# Patient Record
Sex: MALE | Race: WHITE | Employment: OTHER | URBAN - METROPOLITAN AREA
[De-identification: names, ages, dates, MRNs, and addresses within clinical notes are randomized per-mention and may not be internally consistent; named-entity substitution may affect disease eponyms.]

---

## 2017-02-20 ENCOUNTER — HOSPITAL ENCOUNTER (OUTPATIENT)
Dept: PREADMISSION TESTING | Age: 67
Discharge: HOME | End: 2017-02-20
Payer: MEDICARE

## 2017-02-20 VITALS
RESPIRATION RATE: 20 BRPM | TEMPERATURE: 98.4 F | DIASTOLIC BLOOD PRESSURE: 85 MMHG | OXYGEN SATURATION: 97 % | HEART RATE: 78 BPM | HEIGHT: 72 IN | SYSTOLIC BLOOD PRESSURE: 142 MMHG | BODY MASS INDEX: 31.83 KG/M2 | WEIGHT: 235 LBS

## 2017-02-20 LAB
ANION GAP SERPL CALCULATED.3IONS-SCNC: 14 MMOL/L (ref 9–17)
BUN BLDV-MCNC: 12 MG/DL (ref 8–23)
CHLORIDE BLD-SCNC: 102 MMOL/L (ref 98–107)
CO2: 26 MMOL/L (ref 20–31)
CREAT SERPL-MCNC: 0.8 MG/DL (ref 0.7–1.2)
GFR AFRICAN AMERICAN: >60 ML/MIN
GFR NON-AFRICAN AMERICAN: >60 ML/MIN
GFR SERPL CREATININE-BSD FRML MDRD: NORMAL ML/MIN/{1.73_M2}
GFR SERPL CREATININE-BSD FRML MDRD: NORMAL ML/MIN/{1.73_M2}
GLUCOSE BLD-MCNC: 92 MG/DL (ref 70–99)
POTASSIUM SERPL-SCNC: 3.8 MMOL/L (ref 3.7–5.3)
SODIUM BLD-SCNC: 142 MMOL/L (ref 135–144)

## 2017-02-20 PROCEDURE — 36415 COLL VENOUS BLD VENIPUNCTURE: CPT

## 2017-02-20 PROCEDURE — 80051 ELECTROLYTE PANEL: CPT

## 2017-02-20 PROCEDURE — 93005 ELECTROCARDIOGRAM TRACING: CPT

## 2017-02-20 PROCEDURE — 82947 ASSAY GLUCOSE BLOOD QUANT: CPT

## 2017-02-20 PROCEDURE — 84520 ASSAY OF UREA NITROGEN: CPT

## 2017-02-20 PROCEDURE — 82565 ASSAY OF CREATININE: CPT

## 2017-02-20 RX ORDER — SODIUM CHLORIDE, SODIUM LACTATE, POTASSIUM CHLORIDE, CALCIUM CHLORIDE 600; 310; 30; 20 MG/100ML; MG/100ML; MG/100ML; MG/100ML
1000 INJECTION, SOLUTION INTRAVENOUS CONTINUOUS
Status: CANCELLED | OUTPATIENT
Start: 2017-02-20

## 2017-02-21 LAB
EKG ATRIAL RATE: 72 BPM
EKG P AXIS: 15 DEGREES
EKG P-R INTERVAL: 158 MS
EKG Q-T INTERVAL: 374 MS
EKG QRS DURATION: 90 MS
EKG QTC CALCULATION (BAZETT): 409 MS
EKG R AXIS: -20 DEGREES
EKG T AXIS: 52 DEGREES
EKG VENTRICULAR RATE: 72 BPM

## 2017-02-23 ENCOUNTER — ANESTHESIA (OUTPATIENT)
Dept: OPERATING ROOM | Age: 67
End: 2017-02-23
Payer: MEDICARE

## 2017-02-23 ENCOUNTER — HOSPITAL ENCOUNTER (OUTPATIENT)
Age: 67
Setting detail: OUTPATIENT SURGERY
Discharge: HOME | End: 2017-02-23
Attending: ORTHOPAEDIC SURGERY | Admitting: ORTHOPAEDIC SURGERY
Payer: MEDICARE

## 2017-02-23 ENCOUNTER — ANESTHESIA EVENT (OUTPATIENT)
Dept: OPERATING ROOM | Age: 67
End: 2017-02-23
Payer: MEDICARE

## 2017-02-23 VITALS
WEIGHT: 235 LBS | OXYGEN SATURATION: 98 % | RESPIRATION RATE: 23 BRPM | DIASTOLIC BLOOD PRESSURE: 95 MMHG | SYSTOLIC BLOOD PRESSURE: 148 MMHG | BODY MASS INDEX: 31.83 KG/M2 | TEMPERATURE: 97.9 F | HEIGHT: 72 IN | HEART RATE: 78 BPM

## 2017-02-23 VITALS — OXYGEN SATURATION: 100 % | TEMPERATURE: 95.6 F | SYSTOLIC BLOOD PRESSURE: 114 MMHG | DIASTOLIC BLOOD PRESSURE: 85 MMHG

## 2017-02-23 PROCEDURE — 6360000002 HC RX W HCPCS: Performed by: ANESTHESIOLOGY

## 2017-02-23 PROCEDURE — 2500000003 HC RX 250 WO HCPCS: Performed by: NURSE ANESTHETIST, CERTIFIED REGISTERED

## 2017-02-23 PROCEDURE — C1713 ANCHOR/SCREW BN/BN,TIS/BN: HCPCS | Performed by: ORTHOPAEDIC SURGERY

## 2017-02-23 PROCEDURE — 7100000011 HC PHASE II RECOVERY - ADDTL 15 MIN: Performed by: ORTHOPAEDIC SURGERY

## 2017-02-23 PROCEDURE — 6360000002 HC RX W HCPCS: Performed by: ORTHOPAEDIC SURGERY

## 2017-02-23 PROCEDURE — 7100000010 HC PHASE II RECOVERY - FIRST 15 MIN: Performed by: ORTHOPAEDIC SURGERY

## 2017-02-23 PROCEDURE — 3700000000 HC ANESTHESIA ATTENDED CARE: Performed by: ORTHOPAEDIC SURGERY

## 2017-02-23 PROCEDURE — 3600000014 HC SURGERY LEVEL 4 ADDTL 15MIN: Performed by: ORTHOPAEDIC SURGERY

## 2017-02-23 PROCEDURE — 3700000001 HC ADD 15 MINUTES (ANESTHESIA): Performed by: ORTHOPAEDIC SURGERY

## 2017-02-23 PROCEDURE — 2580000003 HC RX 258: Performed by: ANESTHESIOLOGY

## 2017-02-23 PROCEDURE — 6360000002 HC RX W HCPCS: Performed by: NURSE ANESTHETIST, CERTIFIED REGISTERED

## 2017-02-23 PROCEDURE — 3600000004 HC SURGERY LEVEL 4 BASE: Performed by: ORTHOPAEDIC SURGERY

## 2017-02-23 PROCEDURE — 7100000000 HC PACU RECOVERY - FIRST 15 MIN: Performed by: ORTHOPAEDIC SURGERY

## 2017-02-23 PROCEDURE — 7100000001 HC PACU RECOVERY - ADDTL 15 MIN: Performed by: ORTHOPAEDIC SURGERY

## 2017-02-23 PROCEDURE — 2720000003 HC MISC SUTURE/STAPLES/RELOADS/ETC: Performed by: ORTHOPAEDIC SURGERY

## 2017-02-23 DEVICE — MAGNUM 2 KNOTLESS IMPLANT
Type: IMPLANTABLE DEVICE | Site: SHOULDER | Status: FUNCTIONAL
Brand: MAGNUM

## 2017-02-23 RX ORDER — MEPERIDINE HYDROCHLORIDE 50 MG/ML
12.5 INJECTION INTRAMUSCULAR; INTRAVENOUS; SUBCUTANEOUS EVERY 5 MIN PRN
Status: DISCONTINUED | OUTPATIENT
Start: 2017-02-23 | End: 2017-02-23 | Stop reason: HOSPADM

## 2017-02-23 RX ORDER — KETOROLAC TROMETHAMINE 30 MG/ML
15 INJECTION, SOLUTION INTRAMUSCULAR; INTRAVENOUS ONCE
Status: COMPLETED | OUTPATIENT
Start: 2017-02-23 | End: 2017-02-23

## 2017-02-23 RX ORDER — GLYCOPYRROLATE 0.2 MG/ML
INJECTION INTRAMUSCULAR; INTRAVENOUS PRN
Status: DISCONTINUED | OUTPATIENT
Start: 2017-02-23 | End: 2017-02-23 | Stop reason: SDUPTHER

## 2017-02-23 RX ORDER — SODIUM CHLORIDE, SODIUM LACTATE, POTASSIUM CHLORIDE, CALCIUM CHLORIDE 600; 310; 30; 20 MG/100ML; MG/100ML; MG/100ML; MG/100ML
1000 INJECTION, SOLUTION INTRAVENOUS CONTINUOUS
Status: DISCONTINUED | OUTPATIENT
Start: 2017-02-23 | End: 2017-02-23 | Stop reason: HOSPADM

## 2017-02-23 RX ORDER — DIPHENHYDRAMINE HYDROCHLORIDE 50 MG/ML
12.5 INJECTION INTRAMUSCULAR; INTRAVENOUS
Status: DISCONTINUED | OUTPATIENT
Start: 2017-02-23 | End: 2017-02-23 | Stop reason: HOSPADM

## 2017-02-23 RX ORDER — FENTANYL CITRATE 50 UG/ML
100 INJECTION, SOLUTION INTRAMUSCULAR; INTRAVENOUS
Status: DISCONTINUED | OUTPATIENT
Start: 2017-02-23 | End: 2017-02-23

## 2017-02-23 RX ORDER — LIDOCAINE HYDROCHLORIDE 10 MG/ML
INJECTION, SOLUTION EPIDURAL; INFILTRATION; INTRACAUDAL; PERINEURAL PRN
Status: DISCONTINUED | OUTPATIENT
Start: 2017-02-23 | End: 2017-02-23 | Stop reason: SDUPTHER

## 2017-02-23 RX ORDER — ROCURONIUM BROMIDE 10 MG/ML
INJECTION, SOLUTION INTRAVENOUS PRN
Status: DISCONTINUED | OUTPATIENT
Start: 2017-02-23 | End: 2017-02-23 | Stop reason: SDUPTHER

## 2017-02-23 RX ORDER — OXYCODONE HYDROCHLORIDE AND ACETAMINOPHEN 5; 325 MG/1; MG/1
1 TABLET ORAL EVERY 6 HOURS PRN
Qty: 80 TABLET | Refills: 0 | Status: SHIPPED | OUTPATIENT
Start: 2017-02-23 | End: 2018-06-13

## 2017-02-23 RX ORDER — FENTANYL CITRATE 50 UG/ML
25 INJECTION, SOLUTION INTRAMUSCULAR; INTRAVENOUS EVERY 5 MIN PRN
Status: DISCONTINUED | OUTPATIENT
Start: 2017-02-23 | End: 2017-02-23 | Stop reason: HOSPADM

## 2017-02-23 RX ORDER — PROPOFOL 10 MG/ML
INJECTION, EMULSION INTRAVENOUS PRN
Status: DISCONTINUED | OUTPATIENT
Start: 2017-02-23 | End: 2017-02-23 | Stop reason: SDUPTHER

## 2017-02-23 RX ORDER — FENTANYL CITRATE 50 UG/ML
INJECTION, SOLUTION INTRAMUSCULAR; INTRAVENOUS PRN
Status: DISCONTINUED | OUTPATIENT
Start: 2017-02-23 | End: 2017-02-23 | Stop reason: SDUPTHER

## 2017-02-23 RX ORDER — FENTANYL CITRATE 50 UG/ML
50 INJECTION, SOLUTION INTRAMUSCULAR; INTRAVENOUS EVERY 5 MIN PRN
Status: DISCONTINUED | OUTPATIENT
Start: 2017-02-23 | End: 2017-02-23 | Stop reason: HOSPADM

## 2017-02-23 RX ORDER — NEOSTIGMINE METHYLSULFATE 1 MG/ML
INJECTION, SOLUTION INTRAVENOUS PRN
Status: DISCONTINUED | OUTPATIENT
Start: 2017-02-23 | End: 2017-02-23 | Stop reason: SDUPTHER

## 2017-02-23 RX ORDER — MIDAZOLAM HYDROCHLORIDE 1 MG/ML
2 INJECTION INTRAMUSCULAR; INTRAVENOUS
Status: COMPLETED | OUTPATIENT
Start: 2017-02-23 | End: 2017-02-23

## 2017-02-23 RX ORDER — FENTANYL CITRATE 50 UG/ML
50 INJECTION, SOLUTION INTRAMUSCULAR; INTRAVENOUS
Status: COMPLETED | OUTPATIENT
Start: 2017-02-23 | End: 2017-02-23

## 2017-02-23 RX ORDER — ONDANSETRON 2 MG/ML
4 INJECTION INTRAMUSCULAR; INTRAVENOUS
Status: COMPLETED | OUTPATIENT
Start: 2017-02-23 | End: 2017-02-23

## 2017-02-23 RX ORDER — OXYCODONE HYDROCHLORIDE AND ACETAMINOPHEN 5; 325 MG/1; MG/1
1 TABLET ORAL EVERY 6 HOURS PRN
Qty: 80 TABLET | Refills: 0 | Status: SHIPPED | OUTPATIENT
Start: 2017-02-23 | End: 2017-02-23

## 2017-02-23 RX ADMIN — MIDAZOLAM HYDROCHLORIDE 2 MG: 1 INJECTION, SOLUTION INTRAMUSCULAR; INTRAVENOUS at 12:53

## 2017-02-23 RX ADMIN — ONDANSETRON 4 MG: 2 INJECTION INTRAMUSCULAR; INTRAVENOUS at 14:12

## 2017-02-23 RX ADMIN — LIDOCAINE HYDROCHLORIDE 50 MG: 10 INJECTION, SOLUTION EPIDURAL; INFILTRATION; INTRACAUDAL; PERINEURAL at 13:12

## 2017-02-23 RX ADMIN — PROPOFOL 200 MG: 10 INJECTION, EMULSION INTRAVENOUS at 13:12

## 2017-02-23 RX ADMIN — NEOSTIGMINE METHYLSULFATE 3 MG: 1 INJECTION INTRAVENOUS at 14:14

## 2017-02-23 RX ADMIN — Medication 2 G: at 13:17

## 2017-02-23 RX ADMIN — KETOROLAC TROMETHAMINE 15 MG: 30 INJECTION, SOLUTION INTRAMUSCULAR at 15:09

## 2017-02-23 RX ADMIN — FENTANYL CITRATE 50 MCG: 50 INJECTION, SOLUTION INTRAMUSCULAR; INTRAVENOUS at 12:53

## 2017-02-23 RX ADMIN — GLYCOPYRROLATE 0.4 MG: 0.2 INJECTION INTRAMUSCULAR; INTRAVENOUS at 14:14

## 2017-02-23 RX ADMIN — ROCURONIUM BROMIDE 50 MG: 10 INJECTION, SOLUTION INTRAVENOUS at 13:12

## 2017-02-23 RX ADMIN — SODIUM CHLORIDE, POTASSIUM CHLORIDE, SODIUM LACTATE AND CALCIUM CHLORIDE 1000 ML: 600; 310; 30; 20 INJECTION, SOLUTION INTRAVENOUS at 12:42

## 2017-02-23 RX ADMIN — FENTANYL CITRATE 100 MCG: 50 INJECTION INTRAMUSCULAR; INTRAVENOUS at 13:12

## 2017-02-23 ASSESSMENT — ENCOUNTER SYMPTOMS
STRIDOR: 0
SHORTNESS OF BREATH: 0

## 2017-02-23 ASSESSMENT — PAIN SCALES - GENERAL
PAINLEVEL_OUTOF10: 0
PAINLEVEL_OUTOF10: 6
PAINLEVEL_OUTOF10: 0
PAINLEVEL_OUTOF10: 5
PAINLEVEL_OUTOF10: 6
PAINLEVEL_OUTOF10: 0
PAINLEVEL_OUTOF10: 6

## 2017-02-23 ASSESSMENT — PAIN DESCRIPTION - PAIN TYPE: TYPE: ACUTE PAIN

## 2017-02-23 ASSESSMENT — PAIN - FUNCTIONAL ASSESSMENT: PAIN_FUNCTIONAL_ASSESSMENT: 0-10

## 2017-02-23 ASSESSMENT — PAIN DESCRIPTION - LOCATION: LOCATION: SHOULDER

## 2017-02-23 ASSESSMENT — PAIN DESCRIPTION - ORIENTATION: ORIENTATION: RIGHT

## 2017-04-17 ENCOUNTER — ANESTHESIA EVENT (OUTPATIENT)
Dept: OPERATING ROOM | Age: 67
End: 2017-04-17
Payer: MEDICARE

## 2017-04-18 ENCOUNTER — HOSPITAL ENCOUNTER (OUTPATIENT)
Age: 67
Setting detail: OUTPATIENT SURGERY
Discharge: HOME | End: 2017-04-18
Attending: ORTHOPAEDIC SURGERY | Admitting: ORTHOPAEDIC SURGERY
Payer: MEDICARE

## 2017-04-18 ENCOUNTER — ANESTHESIA (OUTPATIENT)
Dept: OPERATING ROOM | Age: 67
End: 2017-04-18
Payer: MEDICARE

## 2017-04-18 VITALS — OXYGEN SATURATION: 100 % | TEMPERATURE: 95.5 F | SYSTOLIC BLOOD PRESSURE: 146 MMHG | DIASTOLIC BLOOD PRESSURE: 88 MMHG

## 2017-04-18 VITALS
HEIGHT: 72 IN | SYSTOLIC BLOOD PRESSURE: 148 MMHG | RESPIRATION RATE: 20 BRPM | WEIGHT: 234.79 LBS | TEMPERATURE: 97.5 F | HEART RATE: 67 BPM | OXYGEN SATURATION: 96 % | DIASTOLIC BLOOD PRESSURE: 83 MMHG | BODY MASS INDEX: 31.8 KG/M2

## 2017-04-18 PROCEDURE — C1713 ANCHOR/SCREW BN/BN,TIS/BN: HCPCS | Performed by: ORTHOPAEDIC SURGERY

## 2017-04-18 PROCEDURE — 3700000000 HC ANESTHESIA ATTENDED CARE: Performed by: ORTHOPAEDIC SURGERY

## 2017-04-18 PROCEDURE — 2720000003 HC MISC SUTURE/STAPLES/RELOADS/ETC: Performed by: ORTHOPAEDIC SURGERY

## 2017-04-18 PROCEDURE — 7100000001 HC PACU RECOVERY - ADDTL 15 MIN: Performed by: ORTHOPAEDIC SURGERY

## 2017-04-18 PROCEDURE — 3600000014 HC SURGERY LEVEL 4 ADDTL 15MIN: Performed by: ORTHOPAEDIC SURGERY

## 2017-04-18 PROCEDURE — 64416 NJX AA&/STRD BRCH PL NFS IMG: CPT | Performed by: ANESTHESIOLOGY

## 2017-04-18 PROCEDURE — 6360000002 HC RX W HCPCS: Performed by: NURSE ANESTHETIST, CERTIFIED REGISTERED

## 2017-04-18 PROCEDURE — 7100000010 HC PHASE II RECOVERY - FIRST 15 MIN: Performed by: ORTHOPAEDIC SURGERY

## 2017-04-18 PROCEDURE — 2500000003 HC RX 250 WO HCPCS: Performed by: NURSE ANESTHETIST, CERTIFIED REGISTERED

## 2017-04-18 PROCEDURE — 6360000002 HC RX W HCPCS: Performed by: ORTHOPAEDIC SURGERY

## 2017-04-18 PROCEDURE — 2580000003 HC RX 258: Performed by: ANESTHESIOLOGY

## 2017-04-18 PROCEDURE — 7100000000 HC PACU RECOVERY - FIRST 15 MIN: Performed by: ORTHOPAEDIC SURGERY

## 2017-04-18 PROCEDURE — 2500000003 HC RX 250 WO HCPCS: Performed by: ANESTHESIOLOGY

## 2017-04-18 PROCEDURE — 2580000003 HC RX 258: Performed by: ORTHOPAEDIC SURGERY

## 2017-04-18 PROCEDURE — 6360000002 HC RX W HCPCS: Performed by: ANESTHESIOLOGY

## 2017-04-18 PROCEDURE — 3700000001 HC ADD 15 MINUTES (ANESTHESIA): Performed by: ORTHOPAEDIC SURGERY

## 2017-04-18 PROCEDURE — 3600000004 HC SURGERY LEVEL 4 BASE: Performed by: ORTHOPAEDIC SURGERY

## 2017-04-18 DEVICE — MAGNUM 2 KNOTLESS IMPLANT
Type: IMPLANTABLE DEVICE | Site: SHOULDER | Status: FUNCTIONAL
Brand: MAGNUM

## 2017-04-18 RX ORDER — FENTANYL CITRATE 50 UG/ML
100 INJECTION, SOLUTION INTRAMUSCULAR; INTRAVENOUS ONCE
Status: COMPLETED | OUTPATIENT
Start: 2017-04-18 | End: 2017-04-18

## 2017-04-18 RX ORDER — MIDAZOLAM HYDROCHLORIDE 1 MG/ML
2 INJECTION INTRAMUSCULAR; INTRAVENOUS ONCE
Status: DISCONTINUED | OUTPATIENT
Start: 2017-04-18 | End: 2017-04-18 | Stop reason: HOSPADM

## 2017-04-18 RX ORDER — OXYCODONE HYDROCHLORIDE AND ACETAMINOPHEN 5; 325 MG/1; MG/1
1-2 TABLET ORAL EVERY 6 HOURS PRN
Qty: 60 TABLET | Refills: 0 | Status: SHIPPED | OUTPATIENT
Start: 2017-04-18 | End: 2017-04-25

## 2017-04-18 RX ORDER — MAGNESIUM HYDROXIDE 1200 MG/15ML
LIQUID ORAL CONTINUOUS PRN
Status: DISCONTINUED | OUTPATIENT
Start: 2017-04-18 | End: 2017-04-18 | Stop reason: HOSPADM

## 2017-04-18 RX ORDER — GLYCOPYRROLATE 0.2 MG/ML
INJECTION INTRAMUSCULAR; INTRAVENOUS PRN
Status: DISCONTINUED | OUTPATIENT
Start: 2017-04-18 | End: 2017-04-18 | Stop reason: SDUPTHER

## 2017-04-18 RX ORDER — SODIUM CHLORIDE, SODIUM LACTATE, POTASSIUM CHLORIDE, CALCIUM CHLORIDE 600; 310; 30; 20 MG/100ML; MG/100ML; MG/100ML; MG/100ML
INJECTION, SOLUTION INTRAVENOUS CONTINUOUS
Status: DISCONTINUED | OUTPATIENT
Start: 2017-04-18 | End: 2017-04-18 | Stop reason: HOSPADM

## 2017-04-18 RX ORDER — ROCURONIUM BROMIDE 10 MG/ML
INJECTION, SOLUTION INTRAVENOUS PRN
Status: DISCONTINUED | OUTPATIENT
Start: 2017-04-18 | End: 2017-04-18 | Stop reason: SDUPTHER

## 2017-04-18 RX ORDER — OXYCODONE HYDROCHLORIDE AND ACETAMINOPHEN 5; 325 MG/1; MG/1
1 TABLET ORAL EVERY 6 HOURS PRN
Qty: 40 TABLET | Refills: 0 | Status: CANCELLED | OUTPATIENT
Start: 2017-04-18

## 2017-04-18 RX ORDER — FENTANYL CITRATE 50 UG/ML
INJECTION, SOLUTION INTRAMUSCULAR; INTRAVENOUS PRN
Status: DISCONTINUED | OUTPATIENT
Start: 2017-04-18 | End: 2017-04-18 | Stop reason: SDUPTHER

## 2017-04-18 RX ORDER — PROPOFOL 10 MG/ML
INJECTION, EMULSION INTRAVENOUS PRN
Status: DISCONTINUED | OUTPATIENT
Start: 2017-04-18 | End: 2017-04-18 | Stop reason: SDUPTHER

## 2017-04-18 RX ORDER — DOCUSATE SODIUM 100 MG/1
100 CAPSULE, LIQUID FILLED ORAL 2 TIMES DAILY PRN
Qty: 60 CAPSULE | Refills: 0 | Status: SHIPPED | OUTPATIENT
Start: 2017-04-18

## 2017-04-18 RX ORDER — NEOSTIGMINE METHYLSULFATE 1 MG/ML
INJECTION, SOLUTION INTRAVENOUS PRN
Status: DISCONTINUED | OUTPATIENT
Start: 2017-04-18 | End: 2017-04-18 | Stop reason: SDUPTHER

## 2017-04-18 RX ORDER — ONDANSETRON 2 MG/ML
INJECTION INTRAMUSCULAR; INTRAVENOUS PRN
Status: DISCONTINUED | OUTPATIENT
Start: 2017-04-18 | End: 2017-04-18 | Stop reason: SDUPTHER

## 2017-04-18 RX ORDER — LIDOCAINE HYDROCHLORIDE 10 MG/ML
1 INJECTION, SOLUTION EPIDURAL; INFILTRATION; INTRACAUDAL; PERINEURAL
Status: COMPLETED | OUTPATIENT
Start: 2017-04-18 | End: 2017-04-18

## 2017-04-18 RX ADMIN — Medication 2 G: at 14:01

## 2017-04-18 RX ADMIN — Medication 550 ML: at 15:25

## 2017-04-18 RX ADMIN — FENTANYL CITRATE 50 MCG: 50 INJECTION INTRAMUSCULAR; INTRAVENOUS at 15:16

## 2017-04-18 RX ADMIN — NEOSTIGMINE METHYLSULFATE 3 MG: 1 INJECTION INTRAVENOUS at 15:03

## 2017-04-18 RX ADMIN — SODIUM CHLORIDE, POTASSIUM CHLORIDE, SODIUM LACTATE AND CALCIUM CHLORIDE: 600; 310; 30; 20 INJECTION, SOLUTION INTRAVENOUS at 13:41

## 2017-04-18 RX ADMIN — ROCURONIUM BROMIDE 50 MG: 10 INJECTION INTRAVENOUS at 13:49

## 2017-04-18 RX ADMIN — ROCURONIUM BROMIDE 20 MG: 10 INJECTION INTRAVENOUS at 14:35

## 2017-04-18 RX ADMIN — PROPOFOL 200 MG: 10 INJECTION, EMULSION INTRAVENOUS at 13:49

## 2017-04-18 RX ADMIN — FENTANYL CITRATE 100 MCG: 50 INJECTION, SOLUTION INTRAMUSCULAR; INTRAVENOUS at 12:52

## 2017-04-18 RX ADMIN — PROPOFOL 50 MG: 10 INJECTION, EMULSION INTRAVENOUS at 14:32

## 2017-04-18 RX ADMIN — ONDANSETRON 4 MG: 2 INJECTION, SOLUTION INTRAMUSCULAR; INTRAVENOUS at 15:03

## 2017-04-18 RX ADMIN — GLYCOPYRROLATE 0.6 MG: 0.2 INJECTION INTRAMUSCULAR; INTRAVENOUS at 15:03

## 2017-04-18 RX ADMIN — FENTANYL CITRATE 50 MCG: 50 INJECTION INTRAMUSCULAR; INTRAVENOUS at 15:13

## 2017-04-18 RX ADMIN — FENTANYL CITRATE 50 MCG: 50 INJECTION INTRAMUSCULAR; INTRAVENOUS at 15:15

## 2017-04-18 RX ADMIN — FENTANYL CITRATE 100 MCG: 50 INJECTION INTRAMUSCULAR; INTRAVENOUS at 13:49

## 2017-04-18 RX ADMIN — LIDOCAINE HYDROCHLORIDE 50 MG: 10 INJECTION, SOLUTION EPIDURAL; INFILTRATION; INTRACAUDAL; PERINEURAL at 13:49

## 2017-04-18 ASSESSMENT — PAIN SCALES - GENERAL
PAINLEVEL_OUTOF10: 4
PAINLEVEL_OUTOF10: 0
PAINLEVEL_OUTOF10: 2
PAINLEVEL_OUTOF10: 2
PAINLEVEL_OUTOF10: 0

## 2017-04-18 ASSESSMENT — PAIN DESCRIPTION - LOCATION
LOCATION: SHOULDER
LOCATION: SHOULDER

## 2017-04-18 ASSESSMENT — PAIN DESCRIPTION - ORIENTATION
ORIENTATION: LEFT
ORIENTATION: LEFT

## 2017-04-18 ASSESSMENT — PAIN DESCRIPTION - PAIN TYPE
TYPE: ACUTE PAIN
TYPE: ACUTE PAIN

## 2017-04-18 ASSESSMENT — PAIN DESCRIPTION - DESCRIPTORS
DESCRIPTORS: BURNING
DESCRIPTORS: ACHING

## 2017-04-18 ASSESSMENT — PAIN - FUNCTIONAL ASSESSMENT: PAIN_FUNCTIONAL_ASSESSMENT: 0-10

## 2017-04-18 ASSESSMENT — PAIN DESCRIPTION - ONSET: ONSET: AWAKENED FROM SLEEP

## 2017-04-18 ASSESSMENT — PAIN DESCRIPTION - PROGRESSION
CLINICAL_PROGRESSION: GRADUALLY IMPROVING
CLINICAL_PROGRESSION: GRADUALLY IMPROVING

## 2017-08-22 ENCOUNTER — HOSPITAL ENCOUNTER (OUTPATIENT)
Dept: ULTRASOUND IMAGING | Facility: CLINIC | Age: 67
Discharge: HOME | End: 2017-08-22
Payer: MEDICARE

## 2017-08-22 DIAGNOSIS — R52 PAIN: ICD-10-CM

## 2017-08-22 PROCEDURE — 76881 US COMPL JOINT R-T W/IMG: CPT

## 2018-06-12 ENCOUNTER — HOSPITAL ENCOUNTER (EMERGENCY)
Age: 68
Discharge: HOME | End: 2018-06-13
Attending: EMERGENCY MEDICINE
Payer: MEDICARE

## 2018-06-12 VITALS
TEMPERATURE: 97.5 F | SYSTOLIC BLOOD PRESSURE: 145 MMHG | RESPIRATION RATE: 16 BRPM | HEART RATE: 73 BPM | BODY MASS INDEX: 32.51 KG/M2 | DIASTOLIC BLOOD PRESSURE: 84 MMHG | OXYGEN SATURATION: 98 % | HEIGHT: 72 IN | WEIGHT: 240 LBS

## 2018-06-12 DIAGNOSIS — S09.90XA INJURY OF HEAD, INITIAL ENCOUNTER: Primary | ICD-10-CM

## 2018-06-12 DIAGNOSIS — S01.81XA FACIAL LACERATION, INITIAL ENCOUNTER: ICD-10-CM

## 2018-06-12 PROCEDURE — 64400 NJX AA&/STRD TRIGEMINAL NRV: CPT

## 2018-06-12 PROCEDURE — 2500000003 HC RX 250 WO HCPCS: Performed by: EMERGENCY MEDICINE

## 2018-06-12 PROCEDURE — 6370000000 HC RX 637 (ALT 250 FOR IP): Performed by: EMERGENCY MEDICINE

## 2018-06-12 PROCEDURE — 12013 RPR F/E/E/N/L/M 2.6-5.0 CM: CPT

## 2018-06-12 PROCEDURE — 99283 EMERGENCY DEPT VISIT LOW MDM: CPT

## 2018-06-12 RX ORDER — CEPHALEXIN 250 MG/1
500 CAPSULE ORAL ONCE
Status: COMPLETED | OUTPATIENT
Start: 2018-06-12 | End: 2018-06-12

## 2018-06-12 RX ORDER — LIDOCAINE HYDROCHLORIDE 10 MG/ML
20 INJECTION, SOLUTION INFILTRATION; PERINEURAL ONCE
Status: COMPLETED | OUTPATIENT
Start: 2018-06-12 | End: 2018-06-12

## 2018-06-12 RX ORDER — ACETAMINOPHEN 325 MG/1
650 TABLET ORAL ONCE
Status: COMPLETED | OUTPATIENT
Start: 2018-06-12 | End: 2018-06-12

## 2018-06-12 RX ADMIN — CEPHALEXIN 500 MG: 250 CAPSULE ORAL at 23:11

## 2018-06-12 RX ADMIN — LIDOCAINE HYDROCHLORIDE 20 ML: 10 INJECTION, SOLUTION INFILTRATION; PERINEURAL at 23:11

## 2018-06-12 RX ADMIN — ACETAMINOPHEN 650 MG: 325 TABLET ORAL at 23:11

## 2018-06-12 ASSESSMENT — PAIN SCALES - GENERAL
PAINLEVEL_OUTOF10: 3
PAINLEVEL_OUTOF10: 2

## 2018-06-12 ASSESSMENT — PAIN DESCRIPTION - DESCRIPTORS: DESCRIPTORS: ACHING

## 2018-06-12 ASSESSMENT — PAIN DESCRIPTION - LOCATION: LOCATION: FACE

## 2018-06-12 ASSESSMENT — PAIN DESCRIPTION - ONSET: ONSET: SUDDEN

## 2018-06-12 ASSESSMENT — PAIN DESCRIPTION - FREQUENCY: FREQUENCY: INTERMITTENT

## 2018-06-12 ASSESSMENT — PAIN DESCRIPTION - PROGRESSION: CLINICAL_PROGRESSION: NOT CHANGED

## 2018-06-12 ASSESSMENT — PAIN DESCRIPTION - PAIN TYPE: TYPE: ACUTE PAIN

## 2018-06-13 RX ORDER — CEPHALEXIN 500 MG/1
500 CAPSULE ORAL 4 TIMES DAILY
Qty: 28 CAPSULE | Refills: 0 | Status: SHIPPED | OUTPATIENT
Start: 2018-06-13 | End: 2018-06-20

## 2018-06-13 ASSESSMENT — ENCOUNTER SYMPTOMS
NAUSEA: 0
ABDOMINAL PAIN: 0
COUGH: 0
PHOTOPHOBIA: 0
RHINORRHEA: 0
DIARRHEA: 0
VOMITING: 0
EYE PAIN: 0
SINUS PRESSURE: 0
SHORTNESS OF BREATH: 0
BLOOD IN STOOL: 0
SORE THROAT: 0

## 2019-02-27 ENCOUNTER — OFFICE VISIT (OUTPATIENT)
Dept: ORTHOPEDIC SURGERY | Age: 69
End: 2019-02-27
Payer: MEDICARE

## 2019-02-27 VITALS — WEIGHT: 240.08 LBS | HEIGHT: 72 IN | BODY MASS INDEX: 32.52 KG/M2

## 2019-02-27 DIAGNOSIS — S46.211A RUPTURE OF RIGHT PROXIMAL BICEPS TENDON, INITIAL ENCOUNTER: Primary | ICD-10-CM

## 2019-02-27 PROCEDURE — 99213 OFFICE O/P EST LOW 20 MIN: CPT | Performed by: ORTHOPAEDIC SURGERY

## 2019-02-27 PROCEDURE — G8417 CALC BMI ABV UP PARAM F/U: HCPCS | Performed by: ORTHOPAEDIC SURGERY

## 2019-02-27 PROCEDURE — G8484 FLU IMMUNIZE NO ADMIN: HCPCS | Performed by: ORTHOPAEDIC SURGERY

## 2019-02-27 PROCEDURE — 1123F ACP DISCUSS/DSCN MKR DOCD: CPT | Performed by: ORTHOPAEDIC SURGERY

## 2019-02-27 PROCEDURE — 4004F PT TOBACCO SCREEN RCVD TLK: CPT | Performed by: ORTHOPAEDIC SURGERY

## 2019-02-27 PROCEDURE — G8427 DOCREV CUR MEDS BY ELIG CLIN: HCPCS | Performed by: ORTHOPAEDIC SURGERY

## 2019-02-27 PROCEDURE — 4040F PNEUMOC VAC/ADMIN/RCVD: CPT | Performed by: ORTHOPAEDIC SURGERY

## 2019-02-27 PROCEDURE — 1101F PT FALLS ASSESS-DOCD LE1/YR: CPT | Performed by: ORTHOPAEDIC SURGERY

## 2019-02-27 PROCEDURE — 3017F COLORECTAL CA SCREEN DOC REV: CPT | Performed by: ORTHOPAEDIC SURGERY

## 2019-02-27 RX ORDER — COVID-19 ANTIGEN TEST
KIT MISCELLANEOUS
COMMUNITY

## 2019-03-01 ASSESSMENT — ENCOUNTER SYMPTOMS
NAUSEA: 0
CHEST TIGHTNESS: 0
ABDOMINAL PAIN: 0
DIARRHEA: 0
SHORTNESS OF BREATH: 0

## 2023-02-21 ENCOUNTER — HOSPITAL ENCOUNTER (EMERGENCY)
Age: 73
Discharge: HOME | End: 2023-02-21
Attending: EMERGENCY MEDICINE
Payer: MEDICARE

## 2023-02-21 ENCOUNTER — APPOINTMENT (OUTPATIENT)
Dept: GENERAL RADIOLOGY | Age: 73
End: 2023-02-21
Payer: MEDICARE

## 2023-02-21 ENCOUNTER — APPOINTMENT (OUTPATIENT)
Dept: CT IMAGING | Age: 73
End: 2023-02-21
Payer: MEDICARE

## 2023-02-21 VITALS
OXYGEN SATURATION: 98 % | TEMPERATURE: 98.2 F | SYSTOLIC BLOOD PRESSURE: 147 MMHG | BODY MASS INDEX: 29.8 KG/M2 | HEART RATE: 74 BPM | DIASTOLIC BLOOD PRESSURE: 80 MMHG | HEIGHT: 72 IN | RESPIRATION RATE: 16 BRPM | WEIGHT: 220 LBS

## 2023-02-21 DIAGNOSIS — S82.865A CLOSED NONDISPLACED MAISONNEUVE FRACTURE OF LEFT LOWER EXTREMITY, INITIAL ENCOUNTER: Primary | ICD-10-CM

## 2023-02-21 PROCEDURE — 73590 X-RAY EXAM OF LOWER LEG: CPT

## 2023-02-21 PROCEDURE — 99284 EMERGENCY DEPT VISIT MOD MDM: CPT

## 2023-02-21 PROCEDURE — 6370000000 HC RX 637 (ALT 250 FOR IP)

## 2023-02-21 PROCEDURE — 73610 X-RAY EXAM OF ANKLE: CPT

## 2023-02-21 PROCEDURE — 73600 X-RAY EXAM OF ANKLE: CPT

## 2023-02-21 PROCEDURE — 73700 CT LOWER EXTREMITY W/O DYE: CPT

## 2023-02-21 RX ORDER — IBUPROFEN 800 MG/1
800 TABLET ORAL ONCE
Status: COMPLETED | OUTPATIENT
Start: 2023-02-21 | End: 2023-02-21

## 2023-02-21 RX ORDER — FENTANYL CITRATE 50 UG/ML
50 INJECTION, SOLUTION INTRAMUSCULAR; INTRAVENOUS ONCE
Status: DISCONTINUED | OUTPATIENT
Start: 2023-02-21 | End: 2023-02-21

## 2023-02-21 RX ORDER — IBUPROFEN 800 MG/1
800 TABLET ORAL EVERY 8 HOURS PRN
Qty: 30 TABLET | Refills: 0 | Status: SHIPPED | OUTPATIENT
Start: 2023-02-21 | End: 2023-03-03

## 2023-02-21 RX ADMIN — IBUPROFEN 800 MG: 800 TABLET, FILM COATED ORAL at 11:44

## 2023-02-21 ASSESSMENT — PAIN SCALES - GENERAL
PAINLEVEL_OUTOF10: 10
PAINLEVEL_OUTOF10: 2

## 2023-02-21 ASSESSMENT — PAIN - FUNCTIONAL ASSESSMENT: PAIN_FUNCTIONAL_ASSESSMENT: 0-10

## 2023-02-21 ASSESSMENT — LIFESTYLE VARIABLES
HOW MANY STANDARD DRINKS CONTAINING ALCOHOL DO YOU HAVE ON A TYPICAL DAY: 1 OR 2
HOW OFTEN DO YOU HAVE A DRINK CONTAINING ALCOHOL: NEVER

## 2023-02-21 ASSESSMENT — ENCOUNTER SYMPTOMS
BACK PAIN: 0
ABDOMINAL PAIN: 0
SHORTNESS OF BREATH: 0

## 2023-02-21 ASSESSMENT — PAIN DESCRIPTION - ORIENTATION
ORIENTATION: LEFT
ORIENTATION: LEFT

## 2023-02-21 ASSESSMENT — PAIN DESCRIPTION - LOCATION
LOCATION: LEG
LOCATION: ANKLE

## 2023-02-21 NOTE — ED PROVIDER NOTES
[facility]     Emergency Department     Faculty Attestation    I performed a history and physical examination of the patient and discussed management with the resident. I have reviewed and agree with the resident’s findings including all diagnostic interpretations, and treatment plans as written at the time of my review. Any areas of disagreement are noted on the chart. I was personally present for the key portions of any procedures. I have documented in the chart those procedures where I was not present during the key portions. For Physician Assistant/ Nurse Practitioner cases/documentation I have personally evaluated this patient and have completed at least one if not all key elements of the E/M (history, physical exam, and MDM). Additional findings are as noted.      Primary Care Physician: Arron Mccurdy MD    Note Started: 11:43 AM EST    History: This is a 72 y.o. male who presents to the Emergency Department with complaint of ankle pain.  Patient twisted his ankle yesterday.  Having difficulty ambulating on it today.  He is using crutches.  He denies any head trauma loss consciousness.    Physical:   height is 6' (1.829 m) and weight is 220 lb (99.8 kg). His oral temperature is 98.2 °F (36.8 °C). His blood pressure is 156/80 (abnormal) and his pulse is 99. His respiration is 16 and oxygen saturation is 95%.  There is obvious swelling and tenderness to the malleolus medially significant greater than the lateral.  Patient also has tenderness to palpation over the fibular head.    Impression: Left ankle and leg pain    Plan: X-ray, analgesia      Medical Decision Making  Amount and/or Complexity of Data Reviewed  Radiology: ordered. Decision-making details documented in ED Course.          (Please note that portions of this note were completed with a voice recognition program.  Efforts were made to edit the dictations but occasionally words are  mis-transcribed.)    Leobardo Walker.  Jonathan Reyes MD, [facility]  Attending Emergency Medicine Physician        Sang Ramos MD  02/21/23 6527

## 2023-02-21 NOTE — ED PROVIDER NOTES
[facility]  ED  Emergency Department Encounter  Emergency Medicine Resident     Pt Name:Tio Harris  MRN: [id number]  [place] 1950  Date of evaluation: 2/21/23  PCP:  Manfred Rod MD  Note Started: 11:22 AM EST      CHIEF COMPLAINT       Chief Complaint   Patient presents with    Leg Pain     Left ankle/shin- pain after fall       HISTORY OF PRESENT ILLNESS  (Location/Symptom, Timing/Onset, Context/Setting, Quality, Duration, Modifying Factors, Severity.)      Garry Sicard is a 67 y.o. male with history of hypertension and hyperlipidemia who presents with left ankle injury that occurred last night. Patient states he was taking out his trash and he was walking on the edge of the driveway when he lost his balance and twisted his leg and landed in an awkward position on the ground. Initially patient was able to do \"hobble\" to ambulate however now he is requiring crutches and is nonweightbearing on his left lower extremity. patient denies head injury, loss of consciousness, blood thinner use. Patient states he now has increasing pain and swelling of his left ankle as well as on his left knee. Patient has been using crutches at home to ambulate. Denies numbness, tingling, other injury. Patient is not taking any medications at home for the pain    PAST MEDICAL / SURGICAL / SOCIAL / FAMILY HISTORY      has a past medical history of Arthritis, Hyperlipidemia, Hypertension, and Wears glasses. has a past surgical history that includes knee surgery (Left); Tonsillectomy; Colonoscopy; Toe Surgery (Left, 9/23/2015); Shoulder arthroscopy (Left, 02/23/2017); Shoulder arthroscopy (Left, 2/23/2017); vascular surgery (Left); Rotator cuff repair (Right, 04/18/2017); and Shoulder arthroscopy (Right, 4/18/2017).       Social History     Socioeconomic History    Marital status:      Spouse name: Not on file    Number of children: Not on file    Years of education: Not on file Highest education level: Not on file   Occupational History    Not on file   Tobacco Use    Smoking status: Every Day     Packs/day: 1.00     Years: 20.00     Pack years: 20.00     Types: Cigars, Cigarettes    Smokeless tobacco: Never    Tobacco comments:     1 cigar daily   Substance and Sexual Activity    Alcohol use: Yes     Comment: 2 drinks daily    Drug use: No    Sexual activity: Not on file   Other Topics Concern    Not on file   Social History Narrative    Not on file     Social Determinants of Health     Financial Resource Strain: Not on file   Food Insecurity: Not on file   Transportation Needs: Not on file   Physical Activity: Not on file   Stress: Not on file   Social Connections: Not on file   Intimate Partner Violence: Not on file   Housing Stability: Not on file       Family History   Problem Relation Age of Onset    Other Mother         alzheimers    High Blood Pressure Father        Allergies:  Patient has no known allergies. Home Medications:  Prior to Admission medications    Medication Sig Start Date End Date Taking? Authorizing Provider   Naproxen Sodium (ALEVE) 220 MG CAPS Take by mouth    Historical Provider, MD   docusate sodium (COLACE) 100 MG capsule Take 1 capsule by mouth 2 times daily as needed for Constipation 4/18/17   Beatrice Lockhart DO   pravastatin (PRAVACHOL) 80 MG tablet Take 80 mg by mouth 2 times daily     Historical Provider, MD   gemfibrozil (LOPID) 600 MG tablet Take 600 mg by mouth daily     Historical Provider, MD   losartan-hydrochlorothiazide (HYZAAR) 100-25 MG per tablet Take 1 tablet by mouth daily    Historical Provider, MD   Multiple Vitamins-Minerals (THERAPEUTIC MULTIVITAMIN-MINERALS) tablet Take 1 tablet by mouth daily    Historical Provider, MD         REVIEW OF SYSTEMS       Review of Systems   Constitutional:  Negative for chills and fever. Respiratory:  Negative for shortness of breath. Cardiovascular:  Negative for chest pain.    Gastrointestinal: Negative for abdominal pain. Musculoskeletal:  Positive for arthralgias. Negative for back pain. Neurological:  Negative for dizziness, weakness and headaches. Hematological:  Does not bruise/bleed easily. PHYSICAL EXAM      INITIAL VITALS:   BP (!) 147/80   Pulse 74   Temp 98.2 °F (36.8 °C) (Oral)   Resp 16   Ht 6' (1.829 m)   Wt 220 lb (99.8 kg)   SpO2 98%   BMI 29.84 kg/m²     Physical Exam  Vitals and nursing note reviewed. Constitutional:       General: He is not in acute distress. Appearance: Normal appearance. HENT:      Head: Normocephalic and atraumatic. Cardiovascular:      Rate and Rhythm: Normal rate and regular rhythm. Pulses: Normal pulses. Heart sounds: Normal heart sounds. Pulmonary:      Effort: Pulmonary effort is normal.      Breath sounds: Normal breath sounds. Abdominal:      Palpations: Abdomen is soft. Tenderness: There is no abdominal tenderness. There is no guarding or rebound. Musculoskeletal:         General: Swelling and tenderness present. Comments: Left lower extremity with significant swelling and bruising of the left dorsal foot and ankle, tender over the left medial malleolus, no carpal bone tenderness, 2+ DP and PT pulses, sensation intact distally, full but painful range of motion of the left ankle, significant tenderness of the left fibular head, no obvious bruising or overlying swelling, right lower extremity without signs of trauma, neurovascularly intact distally   Skin:     General: Skin is warm and dry. Neurological:      General: No focal deficit present. Mental Status: He is alert and oriented to person, place, and time. Motor: No weakness. DDX/DIAGNOSTIC RESULTS / EMERGENCY DEPARTMENT COURSE / MDM     Medical Decision Making  59-year-old male presents after mechanical fall that occurred last night. Patient has pain, swelling, bruising in his left lower extremity and ankle.   Patient slightly hypertensive in the emergency department, vital signs otherwise within normal limits, tender over the medial malleolus and fibular head on the left lower extremity, neurovascularly intact distally.  Plan for pain control, imaging    Amount and/or Complexity of Data Reviewed  Radiology: ordered and independent interpretation performed. Decision-making details documented in ED Course.  Discussion of management or test interpretation with external provider(s): Discussed plan with orthopedics    Risk  Prescription drug management.  Decision regarding hospitalization.  Risk Details: Patient with Maisonneuve fracture of the left lower extremity.  Initial plan for admission and orthopedic repair tomorrow however patient instead would like to follow-up outpatient and have surgical repair later.  Left lower extremity splinted per Ortho.  Discharge instructions given by Ortho.  Discussed return precautions and patient expressed understanding.  Will discharge home with ibuprofen for pain and schedule appointment Dr. Shoemaker            EMERGENCY DEPARTMENT COURSE:      ED Course as of 02/21/23 1814 Tue Feb 21, 2023   1249 Patient appears to have fracture over left fibular head on x-ray [TD]   1336 Spoke with orthopedics who state they are going to order a stress view of his left ankle.  They will come down and likely put the patient in a splint and he can likely be discharged [TD]   1507 Stress view per ortho IMPRESSION:  Widened medial clear space of ankle mortise compatible with ligamentous  injury appears worsened on current images as compared to images from earlier  today although there does not appear to be significant change on the stress  view as compared to the nonstress view on current exam. [TD]   1537 Spoke with orthopedics resident who think the patient will need to be admitted for operative repair of leg tomorrow [TD]   1604 50 fentanyl ordered for orthopedics to splint patient's leg in the department [TD]  1625 Splint per orthopedics. Ortho will order CT of left ankle. Follow-up with Dr. Vania Lino this upcoming Thursday. Patient should be nonweightbearing on this left leg. [TD]   1723 On reexam patient is splinted, states pain is controlled does not need any pain medication. Awaiting CT and patient will be discharged home. [TD]   1813 CT of left ankle completed. Plan for discharge and outpatient follow-up per orthopedics [TD]      ED Course User Index  [TD] Simone Cummins DO         CONSULTS:  IP CONSULT TO ORTHOPEDIC SURGERY      FINAL IMPRESSION      1.  Closed nondisplaced Maisonneuve fracture of left lower extremity, initial encounter          DISPOSITION / PLAN     DISPOSITION        PATIENT REFERRED TO:  Asiya Cisse DO  [address]    Go on 2/24/2023  Go to appointment as scheduled    DISCHARGE MEDICATIONS:  New Prescriptions    No medications on file       Simone Cummins DO  Emergency Medicine Resident    (Please note that portions of thisnote were completed with a voice recognition program.  Efforts were made to edit the dictations but occasionally words are mis-transcribed.)      Simone Cummins DO  Resident  02/21/23 601 [facility] DO NADYA  Resident  02/21/23 1751

## 2023-02-21 NOTE — DISCHARGE INSTRUCTIONS
Orthopaedic Instructions:  -Weight bearing status: Non weight bearing with the left leg  -Do not remove dressings or splint until your follow up date. It is important that you do not get your splint wet. To avoid this and still maintain proper hygiene, you can wrap a garbage/plastic bag (or similar waterproof material) about the splinted arm/leg and secure it with tape while showering. One should still attempt to keep splint out of water with this method. If your splint were to fall off, it is important that you do not attempt to put it back on. Instead, return to the orthopaedic clinic for reapplication (see number below to call). -Always look for signs of compartment syndrome: pain out of proportion to the injury, pain not controlled with pain medication, numbness in digits, changing of color of digits (paleness). If these signs occur return to ED immediately for reassessment.  -Always work on toe motion while in splint to decrease swelling.  -Ice (20 minutes on and off 1 hour) and elevate above the level of the heart to reduce swelling and throbbing pain.  -Call the office or come to Emergency Room if signs of infection appear (hot, swollen, red, draining pus, fever)  -Take medications as prescribed.  -Wean off narcotics (percocet/norco) as soon as possible. Do not take tylenol if still taking narcotics.  -Follow up with Dr. Brooklyn Gilmore in his office with his physician assistant on 2/24/2023 at 9 AM. Call [phone] to schedule/confirm. Splint Care Instructions:       Home care  Wear your splint according to your doctors instructions. Keep the splint dry at all times. Bathe with your splint well out of the water. You can hold the splint outside the tub or shower when bathing. Protect it with a large plastic bag closed at the top end with a rubber band. Use two layers of plastic to help keep the splint dry. Or you can buy a waterproof shield.   If a splint gets wet, dry it with a hair dryer on the [facility] setting. Dont use the warm or hot setting, because those settings can burn your skin. Always keep the splint clean and away from dirt. Keep your splint away from open flames. Dont expose your splint to heat, space heaters, or prolonged sunlight. Excessive heat will cause the splint to change shape. Dont cut or tear the splint. Exercise all the nearby joints not kept still by the splint. If you have a long leg splint, exercise your hip joint and your toes. If you have an arm splint, exercise your shoulder, elbow, thumb, and fingers. Elevate the part of your body that is in the splint. This helps reduce swelling. It is important to ice (20 min on and 1 hour off) and elevate at heart level to decrease pain and swelling. Always look for signs of compartment syndrome: pain out of proportion to the injury, pain not controlled with pain medication, numbness in digits, changing of color of digits. If these signs occur return to ED immediately for reassessment. Follow-up care  Make a follow-up appointment with your healthcare provider, or as advised. When to call your healthcare provider  Call your healthcare provider right away if you have any of these:  Tingling or numbness in the affected area  Severe pain that cannot be relieved with medicine  Splint that feels too tight or too loose  Swelling, coldness, or blue-gray color in the fingers or toes  Splint that is damaged, cracked, or has rough edges that hurt  Pressure sores or red marks that dont go away within 1 hour after removing the splint  Blisters  If splint were to fall off or become saturated, do not attempt to put back on yourself. Return to ED immediately for reapplication if this occurs.       Jean-Pierre Herring, DO  PGY-2 Resident Physician  Orthopaedic [facility]  5/52/4503 at 2:54 PM

## 2023-02-21 NOTE — CONSULTS
Orthopaedic Surgery Consult  (Dr. Latrice Costa)      CC/Reason for consult:  Left ankle/knee pain    HPI:      The patient is a 67 y.o. male who presents to [address] [place] [address] with left ankle and knee pain. Patient states that he was taking his trash out yesterday evening on 2/20/2023 when he rolled his ankle off the side of his porch steps causing him to fall to the ground. He states initially he was able to get up and put some weight on the left ankle however throughout the rest of the evening and into this morning he has been unable to weight-bear. He has some crutches that he has been wearing to help him ambulate. Due to increasing pain and inability to weight-bear he presented to the emergency department for further evaluation. X-rays in the emergency department demonstrated a proximal fibular neck fracture as well as concern for syndesmotic injury of the left ankle. Orthopedic surgery was consulted for further evaluation. At baseline patient states that he does have left ankle pain. He states that he had a severe ankle sprain in high school and has rolled his ankle many times throughout his life. He is however able to ambulate without any assistive devices. He states he was told sometime ago that he did have a \"chip fracture\" of his left ankle. He has an orthopedic history of bilateral shoulder dislocations that resulted in rotator cuff repairs. This was done by Dr. Karsten Ahn around 2017. He denies other fracture or orthopedic history. He does take medications for high cholesterol and hypertension. He denies taking any blood thinners. He states he smokes cigars on occasion otherwise denies tobacco use. He does have a history of varicose vein surgery that was done in the 90s.     Past Medical History:    Past Medical History:   Diagnosis Date    Arthritis     Hyperlipidemia     Hypertension     Wears glasses      Past Surgical History:    Past Surgical History:   Procedure Laterality Date    COLONOSCOPY      KNEE SURGERY Left      x2, meniscus    ROTATOR CUFF REPAIR Right 04/18/2017    rt shoulder scope    SHOULDER ARTHROSCOPY Left 02/23/2017    SHOULDER ARTHROSCOPY Left 2/23/2017    SHOULDER ARTHROSCOPY, ROTATOR CUFF REPAIR, PERIPHERAL NERVE BLOCK PRE-OP performed by Leslie Fermin MD at [address] ARTHROSCOPY Right 4/18/2017    SHOULDER ARTHROSCOPY WITH ROTATOR CUFF REPAIR, PERIPHERAL NERVE BLOCK  performed by Leslie Fermin MD at [facility] Left 9/23/2015    Left 2nd digit PIPJ arthrodesis with K-wire fixation    TONSILLECTOMY      VASCULAR SURGERY Left     vein stripping/ leg     Medications Prior to Admission:   Prior to Admission medications    Medication Sig Start Date End Date Taking? Authorizing Provider   Naproxen Sodium (ALEVE) 220 MG CAPS Take by mouth    Historical Provider, MD   docusate sodium (COLACE) 100 MG capsule Take 1 capsule by mouth 2 times daily as needed for Constipation 4/18/17   Mj Hatchet, DO   pravastatin (PRAVACHOL) 80 MG tablet Take 80 mg by mouth 2 times daily     Historical Provider, MD   gemfibrozil (LOPID) 600 MG tablet Take 600 mg by mouth daily     Historical Provider, MD   losartan-hydrochlorothiazide (HYZAAR) 100-25 MG per tablet Take 1 tablet by mouth daily    Historical Provider, MD   Multiple Vitamins-Minerals (THERAPEUTIC MULTIVITAMIN-MINERALS) tablet Take 1 tablet by mouth daily    Historical Provider, MD     Allergies:    Patient has no known allergies.   Social History:   Social History     Socioeconomic History    Marital status:      Spouse name: None    Number of children: None    Years of education: None    Highest education level: None   Tobacco Use    Smoking status: Every Day     Packs/day: 1.00     Years: 20.00     Pack years: 20.00     Types: Cigars, Cigarettes    Smokeless tobacco: Never    Tobacco comments:     1 cigar daily   Substance and Sexual Activity    Alcohol use: Yes     Comment: 2 drinks daily    Drug use: No     Family History:  Family History   Problem Relation Age of Onset    Other Mother         alzheimers    High Blood Pressure Father        ROS:   Constitutional: Negative for fever and chills. Respiratory: Negative for cough. Cardiovascular: Negative for chest pain. Musculoskeletal: Positive for Left ankle and knee pain. Skin: Negative for itching and rash. Neurological: Negative for numbness, tingling, weakness. PE:  Blood pressure (!) 156/80, pulse 99, temperature 98.2 °F (36.8 °C), temperature source Oral, resp. rate 16, height 6' (1.829 m), weight 220 lb (99.8 kg), SpO2 95 %. Gen: Alert and oriented, NAD, cooperative. Head: Normocephalic, atraumatic. Cardiovascular: Regular rate. Respiratory: Chest symmetric, no accessory muscle use. LLE: Skin intact. Swelling noted about the medial ankle. No lacerations, or abrasions. Significant tenderness about and just distal to the medial malleolus. Minimal tenderness at the lateral malleolus. Pain at the proximal fibula with Hopkin test.  Able to flex and extend his knee from 0 to 130 degrees with minimal pain. Nontender at the medial lateral joint lines of the knee. Tenderness to palpation at the proximal aspect of the fibula. Compartments soft and easily compressible. EHL/FHL/TA/GS complex motor intact. Sural/saphenous/SPN/DPN/plantar nerve distribution SILT. Patient has no groin pain with log roll maneuver. Knee ligament testing deferred due to fracture. Foot is cool to touch with 1+ dorsalis pedis pulse. Labs:  No results for input(s): WBC, HGB, HCT, PLT, INR, PTT, NA, K, BUN, CREATININE, GLUCOSE, SEDRATE, CRP in the last 72 hours. Invalid input(s): PT     Imaging:   Views of the left tib-fib and ankle along with stress views of the ankle demonstrating a proximal fibula fracture with medial joint space narrowing noted on stress view of the ankle.   There is a small fracture noted at the distal aspect of the medial tibia. Assessment/Plan: 67 y.o. male who rolled his ankle off his steps, being seen for:    -Left Maisonneuve fracture  -Left distal tibia fracture    -Plan was discussed with the patient regarding staying for surgery tomorrow or following up outpatient for scheduled surgery. Patient elected to be discharged today and follow-up in the clinic outpatient to schedule surgery.  -Patient was placed in a well-padded short leg splint with stirrups  -Patient will be nonweightbearing to the left lower extremity  -We will get a CT scan to further evaluate the distal tibia fracture  -Okay for diet from orthopedic perspective  -[facility]  -Pain control per emergency department  -Ice and elevate extremity for pain and swelling  -Dispo: Okay to discharge from orthopedic perspective  -Follow up with Dr. Kayley Angelo on Friday, 2/24/2023 at 9 AM.  -Please contact ortho with any questions      Agustín Carrasco DO  Resident Physician, PGY-2   Orthopaedic Surgery  1:38 PM 2/21/2023    This note is created with the assistance of a speech recognition program. While intending to generate a document that actually reflects the content of the visit, the document can still have some errors including those of syntax and sound a like substitutions which may escape proof reading. In such instances, actual meaning can be extrapolated by contextual diversion.

## 2023-02-21 NOTE — ED PROVIDER NOTES
[address] HANDOFF       Handoff taken on the following patient from prior Attending Physician:  Pt Name: Maribell Banda  PCP:  Aníbal Abbott MD    Attestation  I was available and discussed any additional care issues that arose and coordinated the management plans with the resident(s) caring for the patient during my duty period. Any areas of disagreement with resident's documentation of care or procedures are noted on the chart. I was personally present for the key portions of any/all procedures during my duty period. I have documented in the chart those procedures where I was not present during the key portions.            Ghazal Ramsay MD  02/21/23 [id number]

## 2023-02-21 NOTE — ED TRIAGE NOTES
Pt presents via triage after slipping and falling last night off his deck and landing on is left ankle. Pt denies hitting his head or losing consciousness. Pt can weight bear on his ankle, but has a lot of pain in the left ankle and it radiates to the knee

## 2023-02-22 NOTE — PROGRESS NOTES
[address] [facility] [address]  PROGRESS NOTE    Shift date: 2.21.2023  Shift day: Tuesday   Shift # 2    Room # 20/20   Name: Therese Acevedo                Jainism: unknown   Place of Sikh: unknown    Referral: Routine Visit    Admit Date & Time: 2/21/2023 11:11 AM    Assessment:  Therese Acevedo is a 67 y.o. male in the hospital. Upon entering the room writer observes patient calm and coping. Intervention:  Writer introduced self and title as  Writer offered space for the patient  to express feelings, needs, and concerns and provided a ministry presence. Outcome:  Patient remains calm and coping. Plan:  Chaplains will remain available to offer spiritual and emotional support as needed. Electronically signed by Marilee Lee on 2/21/2023 at 11:32 PM.  [facility]  [phone]       02/21/23 1540   Encounter Summary   Service Provided For: Patient   Referral/Consult From: Rounding   Support System Unknown   Last Encounter  02/21/23   Complexity of Encounter Low   Begin Time 1540   End Time  1550   Total Time Calculated 10 min   Encounter    Type Initial Screen/Assessment   Assessment/Intervention/Outcome   Assessment Calm;Coping   Intervention Active listening;Discussed illness injury and its impact; Explored/Affirmed feelings, thoughts, concerns   Outcome Coping     Electronically signed by Lakeshia Acuna on 2/21/2023 at 11:32 PM

## 2023-02-24 ENCOUNTER — OFFICE VISIT (OUTPATIENT)
Dept: ORTHOPEDIC SURGERY | Age: 73
End: 2023-02-24

## 2023-02-24 VITALS — WEIGHT: 220 LBS | BODY MASS INDEX: 29.84 KG/M2

## 2023-02-24 DIAGNOSIS — S82.862A MAISONNEUVE FRACTURE OF FIBULA, LEFT, CLOSED, INITIAL ENCOUNTER: Primary | ICD-10-CM

## 2023-02-24 NOTE — PROGRESS NOTES
[facility]  [id number] [facility]  Dept Phone: [phone]  Dept Fax: [phone]      Orthopaedic Trauma New Patient      CHIEF COMPLAINT:    Chief Complaint   Patient presents with    New Patient     Closed nondisplaced Maisonneuve fracture of left lower extremity       HISTORY OF PRESENT ILLNESS:    The patient is a 67 y.o. male who is being seen as a new patient for a left proximal fibula fracture and left distal tibia fracture with syndesmotic injury. Patient presents today with his wife. Four days ago, on 2/20/23, the patient rolled his left ankle while taking out the trash. He was able to initially bear weight but then the pain progressed as the night went on so he presented to [facility] ER for further evaluation the following day. X-rays revealed a left proximal fibular neck fracture and a distal tibia fracture with syndesmotic injury. Patient was placed in a short leg splint with stirrups by the Orthopedic residents and instructed to follow up outpatient today to discuss surgical intervention. Patient states his pain has been well controlled and has been taking ibuprofen typically only at night which helps relieve the pain. States most of the pain is in the proximal fibula. He has been compliant with nonweightbearing to the left lower extremity with the use of crutches. Denies any injuries or falls but states he does have trouble with balance with crutches and would like a knee scooter prescription. Denies any numbness or tingling. Patient states he has a history of a severe left ankle sprain in high school and has had multiple subsequent left ankle sprains throughout his life which have all been treated conservatively. States he is not sure the most recent ankle sprain but it has been a few years. He denies any significant baseline left ankle pain. He ambulates without any assistive devices at baseline and does not wear any type of ankle brace. He has an orthopedic history of bilateral shoulder dislocations that resulted in rotator cuff repairs done by Dr. Lupe Romo in 2017. He denies other fracture or orthopedic history. He is an avid bowler and golfer and is eager to get back to these activities. He takes daily medication for hypertension and high cholesterol. Denies any tobacco use. Denies taking any blood thinners. Denies any significant cardiac or pulmonary history. Is not a diabetic. History of varicose vein surgery back in the 90s. Denies any other surgical history.        Past Medical History:    Past Medical History:   Diagnosis Date    Arthritis     Hyperlipidemia     Hypertension     Wears glasses        Past Surgical History:    Past Surgical History:   Procedure Laterality Date    COLONOSCOPY      KNEE SURGERY Left      x2, meniscus    ROTATOR CUFF REPAIR Right 04/18/2017    rt shoulder scope    SHOULDER ARTHROSCOPY Left 02/23/2017    SHOULDER ARTHROSCOPY Left 2/23/2017    SHOULDER ARTHROSCOPY, ROTATOR CUFF REPAIR, PERIPHERAL NERVE BLOCK PRE-OP performed by Aaron Grider MD at [address] ARTHROSCOPY Right 4/18/2017    SHOULDER ARTHROSCOPY WITH ROTATOR CUFF REPAIR, PERIPHERAL NERVE BLOCK  performed by Aaron Grider MD at [address] Left 9/23/2015    Left 2nd digit PIPJ arthrodesis with K-wire fixation    TONSILLECTOMY      VASCULAR SURGERY Left     vein stripping/ leg       Current Medications:   Current Outpatient Medications   Medication Sig Dispense Refill    ibuprofen (ADVIL;MOTRIN) 800 MG tablet Take 1 tablet by mouth every 8 hours as needed for Pain 30 tablet 0    Naproxen Sodium 220 MG CAPS Take by mouth      pravastatin (PRAVACHOL) 80 MG tablet Take 80 mg by mouth 2 times daily       gemfibrozil (LOPID) 600 MG tablet Take 600 mg by mouth daily       losartan-hydroCHLOROthiazide (HYZAAR) 100-25 MG per tablet Take 1 tablet by mouth daily      Multiple Vitamins-Minerals (THERAPEUTIC MULTIVITAMIN-MINERALS) tablet Take 1 tablet by mouth daily      docusate sodium (COLACE) 100 MG capsule Take 1 capsule by mouth 2 times daily as needed for Constipation (Patient not taking: Reported on 2/24/2023) 60 capsule 0     No current facility-administered medications for this visit. Allergies:    Patient has no known allergies. Social History:   Social History     Socioeconomic History    Marital status:      Spouse name: Not on file    Number of children: Not on file    Years of education: Not on file    Highest education level: Not on file   Occupational History    Not on file   Tobacco Use    Smoking status: Every Day     Packs/day: 1.00     Years: 20.00     Pack years: 20.00     Types: Cigars, Cigarettes    Smokeless tobacco: Never    Tobacco comments:     1 cigar daily   Substance and Sexual Activity    Alcohol use: Yes     Comment: 2 drinks daily    Drug use: No    Sexual activity: Not on file   Other Topics Concern    Not on file   Social History Narrative    Not on file     Social Determinants of Health     Financial Resource Strain: Not on file   Food Insecurity: Not on file   Transportation Needs: Not on file   Physical Activity: Not on file   Stress: Not on file   Social Connections: Not on file   Intimate Partner Violence: Not on file   Housing Stability: Not on file       Family History:  Family History   Problem Relation Age of Onset    Other Mother         alzheimers    High Blood Pressure Father          REVIEW OF SYSTEMS:  Review of Systems    Gen: no fever, chills, malaise  CV: no chest pain or palpitations  Resp: no cough or shortness of breath  GI: no nausea, vomiting, diarrhea, or constipation  Neuro: no seizures, vertigo, or headaches  Msk: left leg pain   10 remaining systems reviewed and negative      PHYSICAL EXAM:  Wt 220 lb (99.8 kg)   BMI 29.84 kg/m² Body mass index is 29.84 kg/m². Physical Exam  Gen: alert and oriented, no acute distress  Psych: Appropriate affect;  Appropriate knowledge base; Appropriate mood; No hallucinations  Head: normocephalic atraumatic   Chest: symmetric chest excursion  Ortho Exam  LLE: Short leg splint with stirrups clean, dry intact. TTP about the proximal fibula fracture. Knee ligament exam deferred due to known fracture. Exposed digits cool and well perfused with BCR. Able to flex and extend exposed digits. Window created around the anterior medial ankle to evaluate swelling- able to wrinkle skin, scattered ecchymosis. Radiology:   No radiographs obtained today. ASSESSMENT:  67 y.o. male with a Left Maisonneuve fracture/Left distal tibia fracture with syndesmotic injury    PLAN:  - Reviewed imaging and explained the patient's injury in detail. Discussed the risks, benefits, alternatives, expected outcomes of both surgical vs conservative treatment options. - Patient elected to proceed with surgical intervention as he is very active and wishes to return to golfing hopefully by April/ Patient verbalized understanding that he will remain nonweightbearing post op for a minimum of 6 weeks. - Consent obtained in office. Surgery booked for Monday 2/27/23. Patient to follow up 2 weeks post op. No follow-ups on file. No orders of the defined types were placed in this encounter. No orders of the defined types were placed in this encounter. Electronically signed by BETZAIDA Foster CNP on 2/24/2023 at 8:48 AM    This note is created with the assistance of a speech recognition program.  While intending to generate a document that actually reflects the content of the visit, the document can still have some errors including those of syntax and sound a like substitutions which may escape proof reading.   In such instances, actual meaning can be extrapolated by contextual diversion

## 2023-02-27 ENCOUNTER — HOSPITAL ENCOUNTER (OUTPATIENT)
Age: 73
Setting detail: OUTPATIENT SURGERY
Discharge: HOME | End: 2023-02-27
Attending: ORTHOPAEDIC SURGERY | Admitting: ORTHOPAEDIC SURGERY
Payer: MEDICARE

## 2023-02-27 ENCOUNTER — APPOINTMENT (OUTPATIENT)
Dept: GENERAL RADIOLOGY | Age: 73
End: 2023-02-27
Attending: ORTHOPAEDIC SURGERY
Payer: MEDICARE

## 2023-02-27 ENCOUNTER — ANESTHESIA EVENT (OUTPATIENT)
Dept: OPERATING ROOM | Age: 73
End: 2023-02-27
Payer: MEDICARE

## 2023-02-27 ENCOUNTER — ANESTHESIA (OUTPATIENT)
Dept: OPERATING ROOM | Age: 73
End: 2023-02-27
Payer: MEDICARE

## 2023-02-27 VITALS
OXYGEN SATURATION: 100 % | TEMPERATURE: 98.4 F | RESPIRATION RATE: 11 BRPM | WEIGHT: 220 LBS | BODY MASS INDEX: 29.8 KG/M2 | HEART RATE: 63 BPM | DIASTOLIC BLOOD PRESSURE: 70 MMHG | HEIGHT: 72 IN | SYSTOLIC BLOOD PRESSURE: 133 MMHG

## 2023-02-27 DIAGNOSIS — S93.432A ANKLE SYNDESMOSIS DISRUPTION, LEFT, INITIAL ENCOUNTER: ICD-10-CM

## 2023-02-27 DIAGNOSIS — G89.18 POST-OP PAIN: Primary | ICD-10-CM

## 2023-02-27 LAB
EGFR, POC: >60 ML/MIN/1.73M2
GLUCOSE BLD-MCNC: 118 MG/DL (ref 74–100)
POC BUN: 24 MG/DL (ref 8–26)
POC CHLORIDE: 103 MMOL/L (ref 98–107)
POC CREATININE: 0.95 MG/DL (ref 0.51–1.19)
POC HEMATOCRIT: 45 % (ref 41–53)
POC HEMOGLOBIN: 15.2 G/DL (ref 13.5–17.5)
POC IONIZED CALCIUM: 1.27 MMOL/L (ref 1.15–1.33)
POC LACTIC ACID: 1.81 MMOL/L (ref 0.56–1.39)
POC POTASSIUM: 3.5 MMOL/L (ref 3.5–4.5)
POC SODIUM: 139 MMOL/L (ref 138–146)

## 2023-02-27 PROCEDURE — 7100000000 HC PACU RECOVERY - FIRST 15 MIN: Performed by: ORTHOPAEDIC SURGERY

## 2023-02-27 PROCEDURE — 7100000010 HC PHASE II RECOVERY - FIRST 15 MIN: Performed by: ORTHOPAEDIC SURGERY

## 2023-02-27 PROCEDURE — C1713 ANCHOR/SCREW BN/BN,TIS/BN: HCPCS | Performed by: ORTHOPAEDIC SURGERY

## 2023-02-27 PROCEDURE — 2709999900 HC NON-CHARGEABLE SUPPLY: Performed by: ORTHOPAEDIC SURGERY

## 2023-02-27 PROCEDURE — 82330 ASSAY OF CALCIUM: CPT

## 2023-02-27 PROCEDURE — 3209999900 FLUORO FOR SURGICAL PROCEDURES

## 2023-02-27 PROCEDURE — 3700000000 HC ANESTHESIA ATTENDED CARE: Performed by: ORTHOPAEDIC SURGERY

## 2023-02-27 PROCEDURE — 6360000002 HC RX W HCPCS: Performed by: NURSE ANESTHETIST, CERTIFIED REGISTERED

## 2023-02-27 PROCEDURE — 64445 NJX AA&/STRD SCIATIC NRV IMG: CPT | Performed by: ANESTHESIOLOGY

## 2023-02-27 PROCEDURE — 2580000003 HC RX 258: Performed by: ORTHOPAEDIC SURGERY

## 2023-02-27 PROCEDURE — 2580000003 HC RX 258: Performed by: NURSE ANESTHETIST, CERTIFIED REGISTERED

## 2023-02-27 PROCEDURE — 2500000003 HC RX 250 WO HCPCS: Performed by: NURSE ANESTHETIST, CERTIFIED REGISTERED

## 2023-02-27 PROCEDURE — 84520 ASSAY OF UREA NITROGEN: CPT

## 2023-02-27 PROCEDURE — 84132 ASSAY OF SERUM POTASSIUM: CPT

## 2023-02-27 PROCEDURE — 7100000011 HC PHASE II RECOVERY - ADDTL 15 MIN: Performed by: ORTHOPAEDIC SURGERY

## 2023-02-27 PROCEDURE — 85014 HEMATOCRIT: CPT

## 2023-02-27 PROCEDURE — 82435 ASSAY OF BLOOD CHLORIDE: CPT

## 2023-02-27 PROCEDURE — 3600000004 HC SURGERY LEVEL 4 BASE: Performed by: ORTHOPAEDIC SURGERY

## 2023-02-27 PROCEDURE — 6360000002 HC RX W HCPCS: Performed by: ANESTHESIOLOGY

## 2023-02-27 PROCEDURE — 3600000014 HC SURGERY LEVEL 4 ADDTL 15MIN: Performed by: ORTHOPAEDIC SURGERY

## 2023-02-27 PROCEDURE — 7100000001 HC PACU RECOVERY - ADDTL 15 MIN: Performed by: ORTHOPAEDIC SURGERY

## 2023-02-27 PROCEDURE — 84295 ASSAY OF SERUM SODIUM: CPT

## 2023-02-27 PROCEDURE — 82947 ASSAY GLUCOSE BLOOD QUANT: CPT

## 2023-02-27 PROCEDURE — 3700000001 HC ADD 15 MINUTES (ANESTHESIA): Performed by: ORTHOPAEDIC SURGERY

## 2023-02-27 PROCEDURE — 82565 ASSAY OF CREATININE: CPT

## 2023-02-27 PROCEDURE — 73610 X-RAY EXAM OF ANKLE: CPT

## 2023-02-27 PROCEDURE — 2500000003 HC RX 250 WO HCPCS: Performed by: ANESTHESIOLOGY

## 2023-02-27 PROCEDURE — 83605 ASSAY OF LACTIC ACID: CPT

## 2023-02-27 PROCEDURE — 64447 NJX AA&/STRD FEMORAL NRV IMG: CPT | Performed by: ANESTHESIOLOGY

## 2023-02-27 DEVICE — SYSTEM IMPL S STL KNOTLESS SYNDESMOSIS TIGHTROPE XP: Type: IMPLANTABLE DEVICE | Site: ANKLE | Status: FUNCTIONAL

## 2023-02-27 DEVICE — ANCHOR SUT NDL DX FIBERTAK: Type: IMPLANTABLE DEVICE | Site: ANKLE | Status: FUNCTIONAL

## 2023-02-27 RX ORDER — SODIUM CHLORIDE 0.9 % (FLUSH) 0.9 %
5-40 SYRINGE (ML) INJECTION PRN
Status: DISCONTINUED | OUTPATIENT
Start: 2023-02-27 | End: 2023-02-27 | Stop reason: HOSPADM

## 2023-02-27 RX ORDER — DIPHENHYDRAMINE HYDROCHLORIDE 50 MG/ML
12.5 INJECTION INTRAMUSCULAR; INTRAVENOUS
Status: DISCONTINUED | OUTPATIENT
Start: 2023-02-27 | End: 2023-02-27 | Stop reason: HOSPADM

## 2023-02-27 RX ORDER — METOCLOPRAMIDE HYDROCHLORIDE 5 MG/ML
10 INJECTION INTRAMUSCULAR; INTRAVENOUS
Status: DISCONTINUED | OUTPATIENT
Start: 2023-02-27 | End: 2023-02-27 | Stop reason: HOSPADM

## 2023-02-27 RX ORDER — SODIUM CHLORIDE, SODIUM LACTATE, POTASSIUM CHLORIDE, CALCIUM CHLORIDE 600; 310; 30; 20 MG/100ML; MG/100ML; MG/100ML; MG/100ML
INJECTION, SOLUTION INTRAVENOUS CONTINUOUS PRN
Status: DISCONTINUED | OUTPATIENT
Start: 2023-02-27 | End: 2023-02-27 | Stop reason: SDUPTHER

## 2023-02-27 RX ORDER — KETAMINE HCL IN NACL, ISO-OSM 100MG/10ML
SYRINGE (ML) INJECTION PRN
Status: DISCONTINUED | OUTPATIENT
Start: 2023-02-27 | End: 2023-02-27 | Stop reason: SDUPTHER

## 2023-02-27 RX ORDER — SODIUM CHLORIDE 9 MG/ML
25 INJECTION, SOLUTION INTRAVENOUS PRN
Status: DISCONTINUED | OUTPATIENT
Start: 2023-02-27 | End: 2023-02-27 | Stop reason: HOSPADM

## 2023-02-27 RX ORDER — BUPIVACAINE HYDROCHLORIDE 5 MG/ML
INJECTION, SOLUTION EPIDURAL; INTRACAUDAL
Status: COMPLETED | OUTPATIENT
Start: 2023-02-27 | End: 2023-02-27

## 2023-02-27 RX ORDER — SODIUM CHLORIDE 0.9 % (FLUSH) 0.9 %
5-40 SYRINGE (ML) INJECTION EVERY 12 HOURS SCHEDULED
Status: DISCONTINUED | OUTPATIENT
Start: 2023-02-27 | End: 2023-02-27 | Stop reason: HOSPADM

## 2023-02-27 RX ORDER — MORPHINE SULFATE 10 MG/ML
INJECTION, SOLUTION INTRAMUSCULAR; INTRAVENOUS PRN
Status: DISCONTINUED | OUTPATIENT
Start: 2023-02-27 | End: 2023-02-27 | Stop reason: SDUPTHER

## 2023-02-27 RX ORDER — HYDROCODONE BITARTRATE AND ACETAMINOPHEN 5; 325 MG/1; MG/1
1 TABLET ORAL EVERY 6 HOURS PRN
Qty: 10 TABLET | Refills: 0 | Status: SHIPPED | OUTPATIENT
Start: 2023-02-27 | End: 2023-03-02

## 2023-02-27 RX ORDER — ROCURONIUM BROMIDE 10 MG/ML
INJECTION, SOLUTION INTRAVENOUS PRN
Status: DISCONTINUED | OUTPATIENT
Start: 2023-02-27 | End: 2023-02-27 | Stop reason: SDUPTHER

## 2023-02-27 RX ORDER — MEPERIDINE HYDROCHLORIDE 50 MG/ML
12.5 INJECTION INTRAMUSCULAR; INTRAVENOUS; SUBCUTANEOUS EVERY 5 MIN PRN
Status: DISCONTINUED | OUTPATIENT
Start: 2023-02-27 | End: 2023-02-27 | Stop reason: HOSPADM

## 2023-02-27 RX ORDER — PROPOFOL 10 MG/ML
INJECTION, EMULSION INTRAVENOUS PRN
Status: DISCONTINUED | OUTPATIENT
Start: 2023-02-27 | End: 2023-02-27 | Stop reason: SDUPTHER

## 2023-02-27 RX ORDER — DEXAMETHASONE SODIUM PHOSPHATE 10 MG/ML
INJECTION, SOLUTION INTRAMUSCULAR; INTRAVENOUS PRN
Status: DISCONTINUED | OUTPATIENT
Start: 2023-02-27 | End: 2023-02-27 | Stop reason: SDUPTHER

## 2023-02-27 RX ORDER — EPHEDRINE SULFATE/0.9% NACL/PF 50 MG/5 ML
SYRINGE (ML) INTRAVENOUS PRN
Status: DISCONTINUED | OUTPATIENT
Start: 2023-02-27 | End: 2023-02-27 | Stop reason: SDUPTHER

## 2023-02-27 RX ORDER — LIDOCAINE HYDROCHLORIDE 10 MG/ML
INJECTION, SOLUTION EPIDURAL; INFILTRATION; INTRACAUDAL; PERINEURAL PRN
Status: DISCONTINUED | OUTPATIENT
Start: 2023-02-27 | End: 2023-02-27 | Stop reason: SDUPTHER

## 2023-02-27 RX ORDER — HYDROCODONE BITARTRATE AND ACETAMINOPHEN 5; 325 MG/1; MG/1
1 TABLET ORAL EVERY 6 HOURS PRN
Qty: 10 TABLET | Refills: 0 | Status: SHIPPED | OUTPATIENT
Start: 2023-02-27 | End: 2023-02-27 | Stop reason: SDUPTHER

## 2023-02-27 RX ORDER — MAGNESIUM HYDROXIDE 1200 MG/15ML
LIQUID ORAL CONTINUOUS PRN
Status: DISCONTINUED | OUTPATIENT
Start: 2023-02-27 | End: 2023-02-27 | Stop reason: HOSPADM

## 2023-02-27 RX ORDER — GLYCOPYRROLATE 0.2 MG/ML
INJECTION INTRAMUSCULAR; INTRAVENOUS PRN
Status: DISCONTINUED | OUTPATIENT
Start: 2023-02-27 | End: 2023-02-27 | Stop reason: SDUPTHER

## 2023-02-27 RX ORDER — KETOROLAC TROMETHAMINE 30 MG/ML
INJECTION, SOLUTION INTRAMUSCULAR; INTRAVENOUS PRN
Status: DISCONTINUED | OUTPATIENT
Start: 2023-02-27 | End: 2023-02-27 | Stop reason: SDUPTHER

## 2023-02-27 RX ORDER — DROPERIDOL 2.5 MG/ML
0.62 INJECTION, SOLUTION INTRAMUSCULAR; INTRAVENOUS
Status: DISCONTINUED | OUTPATIENT
Start: 2023-02-27 | End: 2023-02-27 | Stop reason: HOSPADM

## 2023-02-27 RX ORDER — HYDRALAZINE HYDROCHLORIDE 20 MG/ML
10 INJECTION INTRAMUSCULAR; INTRAVENOUS
Status: DISCONTINUED | OUTPATIENT
Start: 2023-02-27 | End: 2023-02-27 | Stop reason: HOSPADM

## 2023-02-27 RX ORDER — ONDANSETRON 2 MG/ML
INJECTION INTRAMUSCULAR; INTRAVENOUS PRN
Status: DISCONTINUED | OUTPATIENT
Start: 2023-02-27 | End: 2023-02-27 | Stop reason: SDUPTHER

## 2023-02-27 RX ADMIN — ONDANSETRON 4 MG: 2 INJECTION INTRAMUSCULAR; INTRAVENOUS at 09:00

## 2023-02-27 RX ADMIN — HYDROMORPHONE HYDROCHLORIDE 0.25 MG: 1 INJECTION, SOLUTION INTRAMUSCULAR; INTRAVENOUS; SUBCUTANEOUS at 09:52

## 2023-02-27 RX ADMIN — LIDOCAINE HYDROCHLORIDE 50 MG: 10 INJECTION, SOLUTION EPIDURAL; INFILTRATION; INTRACAUDAL; PERINEURAL at 07:37

## 2023-02-27 RX ADMIN — KETOROLAC TROMETHAMINE 15 MG: 30 INJECTION, SOLUTION INTRAMUSCULAR; INTRAVENOUS at 09:18

## 2023-02-27 RX ADMIN — Medication 25 MG: at 08:34

## 2023-02-27 RX ADMIN — Medication 2 G: at 08:04

## 2023-02-27 RX ADMIN — Medication 25 MG: at 08:03

## 2023-02-27 RX ADMIN — ROCURONIUM BROMIDE 10 MG: 10 INJECTION, SOLUTION INTRAVENOUS at 08:45

## 2023-02-27 RX ADMIN — Medication 10 MG: at 08:25

## 2023-02-27 RX ADMIN — SUGAMMADEX 200 MG: 100 INJECTION, SOLUTION INTRAVENOUS at 09:09

## 2023-02-27 RX ADMIN — HYDROMORPHONE HYDROCHLORIDE 0.25 MG: 1 INJECTION, SOLUTION INTRAMUSCULAR; INTRAVENOUS; SUBCUTANEOUS at 09:58

## 2023-02-27 RX ADMIN — DEXAMETHASONE SODIUM PHOSPHATE 5 MG: 10 INJECTION, SOLUTION INTRAMUSCULAR; INTRAVENOUS at 07:49

## 2023-02-27 RX ADMIN — BUPIVACAINE HYDROCHLORIDE 10 ML: 5 INJECTION, SOLUTION EPIDURAL; INTRACAUDAL at 09:12

## 2023-02-27 RX ADMIN — GLYCOPYRROLATE 0.1 MG: 0.2 INJECTION INTRAMUSCULAR; INTRAVENOUS at 08:20

## 2023-02-27 RX ADMIN — BUPIVACAINE HYDROCHLORIDE 20 ML: 5 INJECTION, SOLUTION EPIDURAL; INTRACAUDAL; PERINEURAL at 09:12

## 2023-02-27 RX ADMIN — ROCURONIUM BROMIDE 20 MG: 10 INJECTION, SOLUTION INTRAVENOUS at 08:11

## 2023-02-27 RX ADMIN — ROCURONIUM BROMIDE 50 MG: 10 INJECTION, SOLUTION INTRAVENOUS at 07:39

## 2023-02-27 RX ADMIN — SODIUM CHLORIDE, POTASSIUM CHLORIDE, SODIUM LACTATE AND CALCIUM CHLORIDE: 600; 310; 30; 20 INJECTION, SOLUTION INTRAVENOUS at 07:30

## 2023-02-27 RX ADMIN — MORPHINE SULFATE 5 MG: 10 INJECTION, SOLUTION INTRAMUSCULAR; INTRAVENOUS at 09:13

## 2023-02-27 RX ADMIN — MORPHINE SULFATE 5 MG: 10 INJECTION, SOLUTION INTRAMUSCULAR; INTRAVENOUS at 08:11

## 2023-02-27 RX ADMIN — PROPOFOL 200 MG: 10 INJECTION, EMULSION INTRAVENOUS at 07:39

## 2023-02-27 RX ADMIN — SODIUM CHLORIDE, POTASSIUM CHLORIDE, SODIUM LACTATE AND CALCIUM CHLORIDE: 600; 310; 30; 20 INJECTION, SOLUTION INTRAVENOUS at 09:09

## 2023-02-27 ASSESSMENT — PAIN SCALES - GENERAL
PAINLEVEL_OUTOF10: 5
PAINLEVEL_OUTOF10: 5

## 2023-02-27 ASSESSMENT — PAIN - FUNCTIONAL ASSESSMENT: PAIN_FUNCTIONAL_ASSESSMENT: NONE - DENIES PAIN

## 2023-02-27 NOTE — ANESTHESIA PROCEDURE NOTES
Peripheral Block    Patient location during procedure: OR  Reason for block: post-op pain management and at surgeon's request  Start time: 2/27/2023 9:12 AM  End time: 2/27/2023 9:28 AM  Staffing  Performed: anesthesiologist   Anesthesiologist: Julietta Collet, MD  Preanesthetic Checklist  Completed: patient identified, IV checked, site marked, risks and benefits discussed, surgical/procedural consents, equipment checked, pre-op evaluation, timeout performed, anesthesia consent given, oxygen available, monitors applied/VS acknowledged, fire risk safety assessment completed and verbalized and blood product R/B/A discussed and consented  Peripheral Block   Patient position: supine  Prep: ChloraPrep  Provider prep: mask and sterile gloves  Patient monitoring: cardiac monitor, continuous pulse ox, continuous capnometry and IV access  Block type: Saphenous  Laterality: left  Injection technique: single-shot  Guidance: ultrasound guided    Needle   Needle type: short-bevel   Needle gauge: 20 G  Needle localization: ultrasound guidance  Needle length: 10 cm  Assessment   Injection assessment: negative aspiration for heme, local visualized surrounding nerve on ultrasound and no intravascular symptoms  Slow fractionated injection: yes  Hemodynamics: stable  Outcomes: uncomplicated and patient tolerated procedure well    Medications Administered  bupivacaine (PF) 0.5 % - Perineural   10 mL - 2/27/2023 9:12:00 AM

## 2023-02-27 NOTE — OP NOTE
Operative Note      Patient: Deepthi El  YOB: 1950  MRN: [id number]  [facility]: [id number]      Date of Procedure: 2/27/2023     Pre-Op Diagnosis: Left ankle syndesmotic injury     Post-Op Diagnosis: Left ankle syndesmotic injury       Procedure(s):  Open treatment with repair of left ankle syndesmotic injury     Surgeon(s):  Yuriy Downing DO     Assistant:  Resident: Fam Green DO; Alvin Goodman MD; Will Aguillon DO     Anesthesia: General     Estimated Blood Loss (mL): 4     Fluids: 800mL crystalloids     TT: 62min at [id number]     Complications: None     Specimens:   * No specimens in log *     Implants:  Arthrex tightrope XP  Arthrex 1.3mm fibertak    Specimens:   * No specimens in log *    Implants:  Implant Name Type Inv. Item Serial No.  Lot No. LRB No. Used Action   SYSTEM IMPL S STL KNOTLESS SYNDESMOSIS TIGHTROPE XP - [id number]  SYSTEM IMPL S STL KNOTLESS SYNDESMOSIS TIGHTROPE XP  [employer]WD [id number] Left 1 Implanted   ANCHOR SUT NDL DX FIBERTAK - [id number]  ANCHOR SUT NDL DX Fiorella Golden JOSE-FV [id number] Left 1 Implanted         Drains: * No LDAs found *    Findings: Left ankle syndesmotic injury with displaced Chaput fragment     Indications: The patient is a 67 y.o. male who is being seen with a left syndesmotic injury. Patient presents today with his wife. On 2/20/23, the patient rolled his left ankle while taking out the trash. He was able to initially bear weight but then the pain progressed as the night went on so he presented to [facility] ER for further evaluation the following day. X-rays revealed a left proximal fibular neck fracture and a distal tibia fracture with syndesmotic injury. Patient was placed in a short leg splint with stirrups by the Orthopedic residents and instructed to follow up outpatient today to discuss surgical intervention.   After further discussion with the patient's current condition, he was amendable to surgery by the way of open treatment of his left syndesmosis. Patient understands the risks, complications, and detailed description of the procedure. All questions were answered in clinic, and patient states that he would like to proceed with syndesmotic fixation. Detailed Description of Procedure: On the day of surgery the patient was met in the preoperative unit where written consent was obtained and the operative site was marked with permanent marker. The patient was then wheeled back to the operating suite and laid in the supine position on the 30/80 table. A well padded non-sterile tourniquet was applied to the left lower extremity. Appropriate antibiotics were being infused at this time. Patient underwent induction of general anesthesia and endotracheal intubation. A time out was held and after all members were in agreement we proceeded forward with surgery. We first got contralateral mortise and lateral films of the right ankle for comparison of our syndesmosis rediuction. After standard sterile preparation and draping of the left leg lower extremity was complete we began with surgery. Esmarch exsanguination was performed and the tourniquet was inflated to 250 mmHg. We marked a direct lateral incision first, as we chose this incision due to us being able to visualize the syndesmosis directly. We used C josh to visualzie our location of the syndesmmiss and made our 5cm incision directly lateral to the distal tibia-fibular joint. We incised the subcutaneous tissue down tohe fibular shaft. We then incised the pernoeal sheath and made sure to create a clean plane at the fibula. We then visualized the torn AITFL and Chaput fragment at the tibia. After we did direct manipulation of the fibula into the groove of the incisura. We then compared our reduction from our lateral films. After further reductions, we then matched it precisely from our contralateral ankle. We then used a K wire to hold our syndesmotic reduction. After we began using the Arthrex tightrope system. We used C-arm to create the appropriate trajectory about 1cm above the incisura and aimed our sleeve and drill 30 degrees anterior to ensure we recreate the proper fixation and not to malreduce our syndresmosis. We drilled 4 cortices from the fibula and tibia and then inserted our endo-button. We removed the  and flipped our button and pulled it as it was sitting appropirately on our tibia. We then tensioned our tightrope appropriately using the jigs provided in the system and had our fibula button sitting appropriately. We then turned our attention to the avulsed AITFL off the fibula. We used the 1.3mm Arthrex Fibertak to drill and made sure our fibertak did not pull our from the lateral distal tibia at the area where AITFL avulsed. We then created a suture construct and tied and tensioned our AITFL. Once this was completed we stressed the ankle to ensure our syndesmosis fixation and ankle was stable. The wound was thoroughly irrigated. The incision was closed with 2-0 vicryl followed by 4-0 monocryl in the subreticular layer. Tourniquet was let down for a total time of 62 minutes. Sterile xeroform, 4x4s, webril were applied as dressing and a short leg splint with ACE bandage was applied. Dr. Margaret Herrera was present for and active in all critical aspects of the case. Post op plan: Patient will follow up in 2 weeks in orthopedic clinic. Splint instruction were stated in the discharge instructions. Patient will be nonweightbearing to the left leg for at least 6 weeks. Knee scooter prescription was provided.      Electronically signed by Rigo Arboleda DO on 2/27/2023 at 1:33 PM

## 2023-02-27 NOTE — ANESTHESIA PRE PROCEDURE
Department of Anesthesiology  Preprocedure Note       Name:  Radha Merchant   Age:  67 y.o.  :  1950                                          MRN:  [id number]         Date:  2023      Surgeon: Michelle White):  Erick Rosales DO    Procedure: Procedure(s):  OPEN TREATMENT OF ANKLE SYNDESMOSIS  ([address], SUPINE, C-ARM, 3080 TABLE, TIGHT ROPE)    Medications prior to admission:   Prior to Admission medications    Medication Sig Start Date End Date Taking? Authorizing Provider   ibuprofen (ADVIL;MOTRIN) 800 MG tablet Take 1 tablet by mouth every 8 hours as needed for Pain 2/21/23 3/3/23  Joi Kilpatrick DO   pravastatin (PRAVACHOL) 80 MG tablet Take 80 mg by mouth 2 times daily     Historical Provider, MD   gemfibrozil (LOPID) 600 MG tablet Take 600 mg by mouth daily     Historical Provider, MD   losartan-hydroCHLOROthiazide (HYZAAR) 100-25 MG per tablet Take 1 tablet by mouth daily    Historical Provider, MD   Multiple Vitamins-Minerals (THERAPEUTIC MULTIVITAMIN-MINERALS) tablet Take 1 tablet by mouth daily    Historical Provider, MD       Current medications:    No current facility-administered medications for this encounter. Allergies:  No Known Allergies    Problem List:  There is no problem list on file for this patient.       Past Medical History:        Diagnosis Date    COVID-19 2020    [facility] symptoms, no treatment    Hyperlipidemia     Hypertension     Left ankle injury 2023    seen in ER 2023    Mobility impaired 2023    NWB left leg, using crutches    Snores     Under care of team     Ortho, Dr. Sukumar Elkins , seen 2023    Wears glasses     Wellness examination     PCP, Dr. Aggie Sofia, last seen 2022       Past Surgical History:        Procedure Laterality Date    COLONOSCOPY      WNL    KNEE ARTHROSCOPY Left     x 2 , meniscus ; many years wisdom    SHOULDER ARTHROSCOPY Left 2017    SHOULDER ARTHROSCOPY, ROTATOR CUFF REPAIR, PERIPHERAL NERVE BLOCK PRE-OP performed by Pavan Good MD at [facility] OR   • SHOULDER ARTHROSCOPY Right 2017    SHOULDER ARTHROSCOPY WITH ROTATOR CUFF REPAIR, PERIPHERAL NERVE BLOCK  performed by Pavan Good MD at [facility] OR   • TOE SURGERY Left 2015    Left 2nd digit PIPJ arthrodesis with K-wire fixation   • TONSILLECTOMY      as child   • VARICOSE VEIN SURGERY Left            Social History:    Social History     Tobacco Use   • Smoking status: Some Days     Packs/day: 1.00     Years: 17.00     Pack years: 17.00     Types: Cigars, Cigarettes     Start date: 1983     Last attempt to quit: 2000     Years since quittin.0   • Smokeless tobacco: Never   • Tobacco comments:     Cigar 2-3 times per week   Substance Use Topics   • Alcohol use: Yes     Alcohol/week: 3.0 standard drinks     Types: 3 Cans of beer per week     Comment: 3-4 times pe week, 3 beers per episode                                Ready to quit: Not Answered  Counseling given: Not Answered  Tobacco comments: Cigar 2-3 times per week      Vital Signs (Current):   Vitals:    23 0626   BP: (!) 141/80   Pulse: 76   Resp: 16   Temp: 97.7 °F (36.5 °C)   TempSrc: Temporal   SpO2: 99%   Weight: 220 lb (99.8 kg)   Height: 6' (1.829 m)                                              BP Readings from Last 3 Encounters:   23 (!) 141/80   23 (!) 147/80   18 (!) 145/84       NPO Status: Time of last liquid consumption:                         Time of last solid consumption:                         Date of last liquid consumption: 23                        Date of last solid food consumption: 23    BMI:   Wt Readings from Last 3 Encounters:   23 220 lb (99.8 kg)   23 220 lb (99.8 kg)   23 220 lb (99.8 kg)     Body mass index is 29.84 kg/m².    CBC: No results found for: WBC, RBC, HGB, HCT, MCV, RDW, PLT    CMP:   Lab Results   Component Value Date/Time     2017 09:26 AM    K 3.8  02/20/2017 09:26 AM     02/20/2017 09:26 AM    CO2 26 02/20/2017 09:26 AM    BUN 12 02/20/2017 09:26 AM    CREATININE 0.95 02/27/2023 06:50 AM    CREATININE 0.80 02/20/2017 09:26 AM    GFRAA >60 02/20/2017 09:26 AM    LABGLOM >60 02/20/2017 09:26 AM    GLUCOSE 92 02/20/2017 09:26 AM       POC Tests:   Recent Labs     02/27/23  0650   POCGLU 118*   POCNA 139   POCK 3.5   POCCL 103   POCBUN 24   POCHEMO 15.2   POCHCT 45       Coags: No results found for: PROTIME, INR, APTT    HCG (If Applicable): No results found for: PREGTESTUR, PREGSERUM, HCG, HCGQUANT     ABGs: No results found for: PHART, PO2ART, RTF8SAL, WUX9GWA, BEART, A7DXGZEB     Type & Screen (If Applicable):  No results found for: LABABO, LABRH    Drug/Infectious Status (If Applicable):  No results found for: HIV, HEPCAB    COVID-19 Screening (If Applicable): No results found for: COVID19        Anesthesia Evaluation  Patient summary reviewed and Nursing notes reviewed no history of anesthetic complications:   Airway: Mallampati: II  TM distance: >3 FB   Neck ROM: full  Mouth opening: > = 3 FB   Dental: normal exam         Pulmonary:Negative Pulmonary ROS and normal exam                               Cardiovascular:    (+) hypertension:,                   Neuro/Psych:   Negative Neuro/Psych ROS              GI/Hepatic/Renal: Neg GI/Hepatic/Renal ROS            Endo/Other: Negative Endo/Other ROS                    Abdominal:             Vascular: Other Findings:           Anesthesia Plan      general     ASA 2     (Nerve block  NO Fentanyl per patient request)  Induction: intravenous. MIPS: Postoperative opioids intended and Prophylactic antiemetics administered. Anesthetic plan and risks discussed with patient. Plan discussed with CRNA.                     Keyon Chin MD   2/27/2023

## 2023-02-27 NOTE — DISCHARGE INSTRUCTIONS
Orthopedic Instructions:  -Weight bearing status: Non-weight bearing left lower extremity  -Keep dressing clean and dry. Maintain splint, do not get wet, do not remove. -If splint were to fall off or become saturated, do not attempt to put back on or dry out. Return to ED immediately for reapplication.  -Always look for signs of compartment syndrome: pain out of proportion to the injury, pain not controlled with pain medication, numbness in digits, changing of color of digits (paleness). If these signs occur return to ED immediately for reassessment.   -Always work on finger motion (to non-injured fingers) while in splint to decrease swelling.  -It is important to ice (20 min on and 1 hour off) and elevate at heart level to decrease pain and swelling.    -Keep dressing clean and dry.  -Call the office or come to Emergency Room if signs of infection appear (hot, swollen, red, draining pus, fever)  -Take medications as prescribed.  -Wean off narcotics (percocet/norco) as soon as possible. Do not take tylenol if still taking narcotics. -No alcoholic beverages or driving/operating machinery for 24 hours.  -Follow up with Dr. Yeni Spicer in his office on 3/14/2023 at 8:30am. Call [phone] with questions/concerns. Mike Pinto,   Orthopedic Surgery Resident, PGY-4  [address]      No alcoholic beverages, no driving or operating machinery, no making important decisions for 24 hours. You may have a normal diet but should eat lightly day of surgery. Drink plenty of fluids.   Urinate within 8 hours after surgery, if unable to urinate call your doctor     Call your doctor for the following:   Chills   Temperature greater than 101   Pain that is not tolerable despite taking pain medicine as ordered   There is increased swelling, redness or warmth at surgical site   There is increased drainage or bleeding from surgical site   Do not remove surgical dressing unless instructed to do so by your surgeon

## 2023-02-27 NOTE — H&P
History and Physical    Pt Name: Elis Andrews  MRN: [id number]  YOB: 1950  Date of evaluation: 2/27/2023  Primary Care Physician: Glen Wilson MD    SUBJECTIVE:   History of Chief Complaint:    Elis Andrews is a 67 y.o. male who is scheduled today for OPEN TREATMENT OF ANKLE SYNDESMOSIS - LEFT. He reports he was taking out the trash when he \"slipped off the steps\" on 2/20/23, rolling his left ankle. He has it splinted, and denies severe pain, states the pain is about a 1/10. He denies any other injuries. Allergies  has No Known Allergies. Medications  Prior to Admission medications    Medication Sig Start Date End Date Taking? Authorizing Provider   ibuprofen (ADVIL;MOTRIN) 800 MG tablet Take 1 tablet by mouth every 8 hours as needed for Pain 2/21/23 3/3/23  Raymon Joshua DO   pravastatin (PRAVACHOL) 80 MG tablet Take 80 mg by mouth 2 times daily     Historical Provider, MD   gemfibrozil (LOPID) 600 MG tablet Take 600 mg by mouth daily     Historical Provider, MD   losartan-hydroCHLOROthiazide (HYZAAR) 100-25 MG per tablet Take 1 tablet by mouth daily    Historical Provider, MD   Multiple Vitamins-Minerals (THERAPEUTIC MULTIVITAMIN-MINERALS) tablet Take 1 tablet by mouth daily    Historical Provider, MD     Past Medical History    has a past medical history of COVID-19, Hyperlipidemia, Hypertension, Left ankle injury, Mobility impaired, Snores, Under care of team, Wears glasses, and Wellness examination. Past Surgical History   has a past surgical history that includes Tonsillectomy; Colonoscopy; Toe Surgery (Left, 09/23/2015); Shoulder arthroscopy (Left, 02/23/2017); Shoulder arthroscopy (Right, 04/18/2017); Varicose vein surgery (Left); and Knee arthroscopy (Left). Social History   reports that he has been smoking cigars and cigarettes. He started smoking about 40 years ago. He has a 17.00 pack-year smoking history.  He has never used smokeless tobacco.   reports current alcohol use of about 3.0 standard drinks per week. reports no history of drug use. Marital Status   Children 3  Occupation retired, [employer] and [employer]   Family History  Family Status   Relation Name Status    Mother          dementia    Father   at age 80     family history includes High Blood Pressure in his father; Other in his mother. Review of Systems:  CONSTITUTIONAL:   negative for fevers, chills, fatigue and malaise    EYES:   negative for double vision, blurred vision and photophobia +glasses   HEENT:   negative for tinnitus, epistaxis and sore throat     RESPIRATORY:   negative for cough, shortness of breath, wheezing     CARDIOVASCULAR:   negative for chest pain, palpitations, syncope, edema     GASTROINTESTINAL:   negative for nausea, vomiting     GENITOURINARY:   negative for incontinence     MUSCULOSKELETAL:   negative for neck or back pain  +see HPI, also reports chronic right shoulder pain (has had prior right shoulder surgery)   NEUROLOGICAL:   Negative for weakness and tingling  negative for headaches and dizziness     PSYCHIATRIC:   negative for anxiety       OBJECTIVE:   VITALS:  height is 6' (1.829 m) and weight is 220 lb (99.8 kg). His temporal temperature is 97.7 °F (36.5 °C). His blood pressure is 141/80 (abnormal) and his pulse is 76. His respiration is 16 and oxygen saturation is 99%. CONSTITUTIONAL:alert & oriented x 3, no acute distress. Calm and pleasant. SKIN:  Warm and dry, no rashes on exposed areas of skin   HEAD:  Normocephalic, atraumatic   EYES: PERRL. EOMs intact. Wearing glasses. EARS:  Equal bilaterally, no edema or thickening, skin is intact without lumps or lesions. No discharge. Hearing grossly WNL. NOSE:  Nares patent. No rhinorrhea   MOUTH/THROAT:  Mucous membranes moist, tongue is pink, uvula midline, teeth appear to be intact  NECK:Full ROM  LUNGS: Respirations even and non-labored. Clear to auscultation bilaterally, no wheezes, rales, or rhonchi. CARDIOVASCULAR: Regular rate and rhythm, no murmurs/rubs/gallops   ABDOMEN: soft, non-tender, non-distended, bowel sounds active x 4   EXTREMITIES: LLE splinted, exposed toes warm to touch and sensation and motion intact. Varicosities RLE. NEUROLOGIC: CN II-XII are grossly intact. Gait not assessed.   IMPRESSIONS:   Left ankle injury/fracture   PLANS:   OPEN TREATMENT OF ANKLE SYNDESMOSIS- LEFT  BETZAIDA CARBALLO - CNP   Electronically signed 2/27/2023 at 6:51 AM

## 2023-02-27 NOTE — ANESTHESIA POSTPROCEDURE EVALUATION
POST- ANESTHESIA EVALUATION       Pt Name: Kasey Fierro  MRN: [id number]  [place]: 1950  Date of evaluation: 2/27/2023  Time:  10:51 AM      /70   Pulse 63   Temp 98.4 °F (36.9 °C)   Resp 11   Ht 6' (1.829 m)   Wt 220 lb (99.8 kg)   SpO2 100%   BMI 29.84 kg/m²      Consciousness Level  Awake  Cardiopulmonary Status  Stable  Pain Adequately Treated YES  Nausea / Vomiting  NO  Adequate Hydration  YES  Anesthesia Related Complications NONE      Electronically signed by Sia Lujan MD on 2/27/2023 at 10:51 AM       Department of Anesthesiology  Postprocedure Note    Patient: Kasey Fierro  MRN: [id number]  YOB: 1950  Date of evaluation: 2/27/2023      Procedure Summary     Date: 02/27/23 Room / Location: [address]    Anesthesia Start: 0730 Anesthesia Stop: 8793    Procedure: OPEN REDUCTION INTERNAL FIXATION LEFT SYNDOSMOSIS INJURY; SPLINT APPLICATION; NERVE BLOCK (BY DR Ronald Mejía) (Left: Ankle) Diagnosis:       Syndesmotic disruption of left ankle, initial encounter      (DISRUPTION OF LEFT ANKLE SYNDESMOTIC DISRUPTION)    Surgeons: Judge Sonia DO Responsible Provider: Sia Lujan MD    Anesthesia Type: general ASA Status: 2          Anesthesia Type: No value filed.     Warren Phase I: Warren Score: 10    Warren Phase II: Warren Score: 10      Anesthesia Post Evaluation

## 2023-02-27 NOTE — ANESTHESIA PROCEDURE NOTES
Peripheral Block    Patient location during procedure: OR  Reason for block: post-op pain management and at surgeon's request  Start time: 2/27/2023 9:12 AM  End time: 2/27/2023 9:28 AM  Staffing  Performed: anesthesiologist   Anesthesiologist: Cirilo Tobin MD  Preanesthetic Checklist  Completed: patient identified, IV checked, site marked, risks and benefits discussed, surgical/procedural consents, equipment checked, pre-op evaluation, timeout performed, anesthesia consent given, oxygen available, monitors applied/VS acknowledged, fire risk safety assessment completed and verbalized and blood product R/B/A discussed and consented  Peripheral Block   Patient position: supine  Prep: ChloraPrep  Provider prep: mask and sterile gloves  Patient monitoring: cardiac monitor, continuous pulse ox, continuous capnometry and IV access  Block type: Sciatic  Popliteal  Laterality: left  Injection technique: single-shot  Guidance: ultrasound guided    Needle   Needle type: short-bevel   Needle gauge: 20 G  Needle localization: ultrasound guidance  Needle length: 10 cm  Assessment   Injection assessment: negative aspiration for heme, local visualized surrounding nerve on ultrasound and no intravascular symptoms  Slow fractionated injection: yes  Hemodynamics: stable  Outcomes: uncomplicated and patient tolerated procedure well    Medications Administered  bupivacaine (PF) 0.5 % - Perineural   20 mL - 2/27/2023 9:12:00 AM

## 2023-02-27 NOTE — BRIEF OP NOTE
Brief Postoperative Note      Patient: Iain Menendez  YOB: 1950  MRN: [id number]    Date of Procedure: 2/27/2023    Pre-Op Diagnosis: Left ankle syndesmotic injury    Post-Op Diagnosis: Left ankle syndesmotic injury       Procedure(s):  Open treatment with repair of left ankle syndesmotic injury    Surgeon(s):  Nehal Oliva DO    Assistant:  Resident: Basia Blount DO; Demetri Mohan MD; Lj Benitez DO    Anesthesia: General    Estimated Blood Loss (mL): 4    Fluids: 800mL crystalloids    TT: 62min at 035KUNE    Complications: None    Specimens:   * No specimens in log *    Implants:  Arthrex tightrope XP  Arthrex 1.3mm fibertak  Implant Name Type Inv. Item Serial No.  Lot No. LRB No. Used Action   SYSTEM IMPL S STL KNOTLESS SYNDESMOSIS TIGHTROPE XP - [id number]  SYSTEM IMPL S STL KNOTLESS SYNDESMOSIS TIGHTROPE XP  [employer]- [id number] Left 1 Implanted   WIRE FIX 5/64X9 IN [facility] - [id number]  WIRE FIX 5/64X9 IN [facility]-  Left 1 Implanted   ANCHOR SUT NDL DX FIBERTAK - [id number]  ANCHOR SUT NDL DX [employer] MY-[id number] Left 1 Implanted         Drains: * No LDAs found *    Findings: Left ankle syndesmotic injury. See op note for details.     Electronically signed by Lj Benitez DO on 2/27/2023 at 8:54 AM

## 2023-03-14 ENCOUNTER — OFFICE VISIT (OUTPATIENT)
Dept: ORTHOPEDIC SURGERY | Age: 73
End: 2023-03-14

## 2023-03-14 VITALS — HEIGHT: 72 IN | WEIGHT: 220 LBS | BODY MASS INDEX: 29.8 KG/M2

## 2023-03-14 DIAGNOSIS — S93.432D ANKLE SYNDESMOSIS DISRUPTION, LEFT, SUBSEQUENT ENCOUNTER: Primary | ICD-10-CM

## 2023-03-14 DIAGNOSIS — S82.862D CLOSED DISPLACED MAISONNEUVE FRACTURE OF LEFT LOWER EXTREMITY WITH ROUTINE HEALING, SUBSEQUENT ENCOUNTER: ICD-10-CM

## 2023-03-14 NOTE — PROGRESS NOTES
[facility]  [address] [place] [address]  Dept Phone: [phone]  Dept Fax: [phone]      Orthopa[facility] Trauma Clinic Follow Up      Subjective:   Date of Surgery: 2/27/2023    Wood Lee is a 67y.o. year old male who presents to the clinic today for routine follow up 2 weeks post operatively from open treatment of his left ankle syndesmotic injury. Patient presents today with his wife. Patient states he has been compliant with nonweightbearing to the left lower extremity with the use of a rolling knee scooter. Patient states overall he does not have much pain and only had to take over the counter ibuprofen at night for the first few nights after surgery. Denies any numbness or tingling. Denies any new injuries or falls. Review of Systems  Gen: no fever, chills, malaise  CV: no chest pain or palpitations  Resp: no cough or shortness of breath  GI: no nausea, vomiting, diarrhea, or constipation  Neuro: no seizures, vertigo, or headache  Msk: no left ankle pain   10 remaining systems reviewed and negative    Objective : There were no vitals filed for this visit. Body mass index is 29.84 kg/m². General: No acute distress, resting comfortably in the clinic  Neuro: alert. oriented  Eyes: Extra-ocular muscles intact  Pulm: Respirations unlabored and regular. Skin: warm, well perfused  Psych:   Patient has good fund of knowledge and displays understanding of exam, diagnosis, and plan. LLE:  Skin intact. Mild swelling and ecchymosis about the ankle. Surgical incision well approximated and healing without evidence of dehiscence, drainage or erythema. Compartments soft. 2+ DP pulse. TA/EHL/FHL/GS motor intact. Deep and Superficial Peroneal/Saphenous/Sural SILT. Radiology:  No radiographs obtained today. Assessment:   67y.o. year old male with open treatment of his left syndesmotic injury; DOS: 2/27/2023.   Plan:   - Clean incisions and/or wounds with soap and water daily then apply clean dressings until wounds are healed and dry. If steri strips are in place, leave in place until they naturally fall off. Avoid soaks of any kind (bath, hot tub, pool, lake, etc.).     - CAM boot applied to the left foot. Remain nonweightbearing to the left lower extremity at all times. Remain in the boot at all times except for removal for hygiene and to work on gentle range of motion of the ankle.     - Plan to f/u in 4 weeks with x-rays                 - The patient was given a CAM boot  for the left  lower extremity. The patient is non-ambulatory and has weakness and instability of their left ankle which requires stabilization from the brace to improve their function.       Follow up:Return in about 4 weeks (around 4/11/2023) for x-rays out of cast/splint/brace.    No orders of the defined types were placed in this encounter.         No orders of the defined types were placed in this encounter.      Electronically signed by BETZAIDA Altamirano CNP on 3/14/2023 at 9:05 AM    This note is created with the assistance of a speech recognition program.  While intending to generate a document that actually reflects the content of the visit, the document can still have some errors including those of syntax and sound a like substitutions which may escape proof reading.  In such instances, actual meaning can be extrapolated by contextual diversion

## 2023-03-14 NOTE — PATIENT INSTRUCTIONS
- Clean incisions and/or wounds with soap and water daily then apply clean dressings until wounds are healed and dry. If steri strips are in place, leave in place until they naturally fall off. Avoid soaks of any kind (bath, hot tub, pool, lake, etc.).     - CAM boot applied to the left foot. Remain nonweightbearing to the left lower extremity at all times. Remain in the boot at all times except for removal for hygiene and to work on gentle range of motion of the ankle.

## 2023-04-11 ENCOUNTER — OFFICE VISIT (OUTPATIENT)
Dept: ORTHOPEDIC SURGERY | Age: 73
End: 2023-04-11

## 2023-04-11 VITALS — HEIGHT: 72 IN | WEIGHT: 220 LBS | BODY MASS INDEX: 29.8 KG/M2

## 2023-04-11 DIAGNOSIS — S93.432D ANKLE SYNDESMOSIS DISRUPTION, LEFT, SUBSEQUENT ENCOUNTER: Primary | ICD-10-CM

## 2023-04-11 PROCEDURE — 99024 POSTOP FOLLOW-UP VISIT: CPT | Performed by: ORTHOPAEDIC SURGERY

## 2023-05-16 ENCOUNTER — OFFICE VISIT (OUTPATIENT)
Dept: ORTHOPEDIC SURGERY | Age: 73
End: 2023-05-16

## 2023-05-16 VITALS — WEIGHT: 233 LBS | BODY MASS INDEX: 31.6 KG/M2

## 2023-05-16 DIAGNOSIS — S93.432D ANKLE SYNDESMOSIS DISRUPTION, LEFT, SUBSEQUENT ENCOUNTER: Primary | ICD-10-CM

## 2023-05-23 NOTE — PROGRESS NOTES
[facility]  [address] [place]  [address]  Dept Phone: [phone]  Dept Fax: [phone]      Orthopaedic Trauma Clinic Follow Up      Subjective:   Date of Surgery: 2/27/2023    Hoang Guzmán is a 67y.o. year old male who presents to the clinic today for routine follow up 11 weeks post operatively from open treatment of his left syndesmotic ankle injury with Arthrex tightrope. Patient presents today with his wife. Patient states he has been weightbearing as tolerated in a regular tennis shoe with no pain in the ankle. States he did not do any formal PT and just did his own exercises at home. States his ankle feels more stable after surgery than it did prior to his injury and surgery. He has regained full range of motion of the ankle without difficulty. Denies any limiting activities or movements. Denies any new injuries or falls. Denies any numbness or tingling. Review of Systems  Gen: no fever, chills, malaise  CV: no chest pain or palpitations  Resp: no cough or shortness of breath  GI: no nausea, vomiting, diarrhea, or constipation  Neuro: no seizures, vertigo, or headache  Msk: no left ankle pain   10 remaining systems reviewed and negative    Objective : There were no vitals filed for this visit. Body mass index is 31.6 kg/m². General: No acute distress, resting comfortably in the clinic  Neuro: alert. oriented  Eyes: Extra-ocular muscles intact  Pulm: Respirations unlabored and regular. Skin: warm, well perfused  Psych:   Patient has good fund of knowledge and displays understanding of exam, diagnosis, and plan. LLE:  Skin intact. Surgical incisions well approximated and healed without evidence of infection. No TTP about the ankle. Full painless range of motion of the ankle. No pain or weakness with resisted eversion or inversion. No laxity with varus or valgus stress of the ankle. Compartments soft. 2+ DP pulse. TA/EHL/FHL/GS motor intact.  Deep and

## (undated) DEVICE — INTENDED FOR TISSUE SEPARATION, AND OTHER PROCEDURES THAT REQUIRE A SHARP SURGICAL BLADE TO PUNCTURE OR CUT.: Brand: BARD-PARKER ® CARBON RIB-BACK BLADES

## (undated) DEVICE — SHOULDER SUSPENSION KIT 6 PER BOX

## (undated) DEVICE — SUTURE MCRYL SZ 3-0 L27IN ABSRB UD L24MM PS-1 3/8 CIR PRIM Y936H

## (undated) DEVICE — STERLING XTRASHARP SHAVER GREAT WHITE SHAVER BLADE, 4.2 MM: Brand: STERLING XTRASHARP SHAVER GREAT WHITE

## (undated) DEVICE — GOWN,AURORA,NONREINFORCED,LARGE: Brand: MEDLINE

## (undated) DEVICE — GLOVE ORANGE PI 7 1/2   MSG9075

## (undated) DEVICE — MEDI-VAC SUCTION HANDLE REGULAR CAPACITY: Brand: CARDINAL HEALTH

## (undated) DEVICE — GARMENT COMPR STD FOR 17IN CALF UNIF THER FLOTRN

## (undated) DEVICE — GAUZE,SPONGE,FLUFF,6"X6.75",STRL,5/TRAY: Brand: MEDLINE

## (undated) DEVICE — SOLUTION SCRB 4OZ 4% CHG H2O AIDED FOR PREOPERATIVE SKIN

## (undated) DEVICE — SMARTSTITCH PERFECTPASSER                                    MAGNUMWIRE SUTURE CARTRIDGES, WHITE: Brand: SMARTSTITCH PERFECTPASSER

## (undated) DEVICE — GOWN,AURORA,NONRNF,XL,30/CS: Brand: MEDLINE

## (undated) DEVICE — TOWEL,OR,DSP,ST,NATURAL,DLX,4/PK,20PK/CS: Brand: MEDLINE

## (undated) DEVICE — C-ARM: Brand: UNBRANDED

## (undated) DEVICE — DRAPE,U/ SHT,SPLIT,PLAS,STERIL: Brand: MEDLINE

## (undated) DEVICE — SMARTSTITCH PERFECTPASSER CONNECTOR: Brand: PERFECTPASSER

## (undated) DEVICE — AMBIENT SUPER TURBOVAC 90: Brand: COBLATION

## (undated) DEVICE — GLOVE ORANGE PI 8   MSG9080

## (undated) DEVICE — THE STERILE LIGHT HANDLE COVER IS USED WITH STERIS SURGICAL LIGHTING AND VISUALIZATION SYSTEMS.

## (undated) DEVICE — DRAPE,REIN 53X77,STERILE: Brand: MEDLINE

## (undated) DEVICE — WIRE FIX 5/64X9 IN KIRSCHNER
Type: IMPLANTABLE DEVICE | Site: ANKLE | Status: NON-FUNCTIONAL
Removed: 2023-02-27

## (undated) DEVICE — ZIMMER® STERILE DISPOSABLE TOURNIQUET CUFF WITH PROTECTIVE SLEEVE AND PLC, DUAL PORT, SINGLE BLADDER, 24 IN. (61 CM)

## (undated) DEVICE — CHLORAPREP 26ML ORANGE

## (undated) DEVICE — TOWEL SURG W16XL26IN WHT NONFENESTRATED ST 4 PER PK

## (undated) DEVICE — SHOULDER CANNULA SET WITHOUT FENESTRATIONS, 5.5 MM (I.D.) X 70 MM: Brand: CONMED

## (undated) DEVICE — BANDAGE COBAN 6 IN WND 6INX5YD FOAM

## (undated) DEVICE — KIT INSTR DRL GUID 1.6/1.35MM GUIDWIRE DISP FIBERTAK DX

## (undated) DEVICE — STERLING OVAL BUR, 4.0 MM: Brand: STERLING

## (undated) DEVICE — ADHESIVE SKIN CLOSURE TOP 36 CC HI VISC DERMBND MINI

## (undated) DEVICE — MARKER,SKIN,WI/RULER AND LABELS: Brand: MEDLINE

## (undated) DEVICE — ORTHO EXT PK

## (undated) DEVICE — SUTURE VCRL SZ 2-0 L18IN ABSRB UD CT-1 L36MM 1/2 CIR J839D

## (undated) DEVICE — APPLICATOR MEDICATED 26 CC SOLUTION HI LT ORNG CHLORAPREP

## (undated) DEVICE — ELECTRODE PT RET AD L9FT HI MOIST COND ADH HYDRGEL CORDED

## (undated) DEVICE — SCALPEL 11 BLADE

## (undated) DEVICE — GLOVE SURG SZ 65 THK91MIL LTX FREE SYN POLYISOPRENE

## (undated) DEVICE — C-ARMOR C-ARM EQUIPMENT COVERS CLEAR STERILE UNIVERSAL FIT 12 PER CASE: Brand: C-ARMOR

## (undated) DEVICE — PACK PROCEDURE SURG SVMMC SHLDR ARTHRO PK

## (undated) DEVICE — GLOVE ORANGE PI 8 1/2   MSG9085

## (undated) DEVICE — SHEET, ORTHO, SPLIT, STERILE: Brand: MEDLINE

## (undated) DEVICE — GLOVE ORANGE PI 7   MSG9070

## (undated) DEVICE — SMARTSTITCH PERFECTPASSER                                    MAGNUMWIRE SUTURE CARTRIDGES, BLUE CO-BRAID: Brand: SMARTSTITCH PERFECTPASSER

## (undated) DEVICE — BANDAGE COMPR W6INXL12FT SMOOTH FOR LIMB EXSANG ESMARCH

## (undated) DEVICE — CYSTO/BLADDER IRRIGATION SET, REGULATING CLAMP

## (undated) DEVICE — SURGICAL SUCTION CONNECTING TUBE WITH MALE CONNECTOR AND SUCTION CLAMP, 2 FT. LONG (.6 M), 5 MM I.D.: Brand: CONMED

## (undated) DEVICE — PADDING CAST W6INXL4YD COT LO LINTING WYTEX